# Patient Record
Sex: MALE | Race: WHITE | NOT HISPANIC OR LATINO | Employment: UNEMPLOYED | URBAN - METROPOLITAN AREA
[De-identification: names, ages, dates, MRNs, and addresses within clinical notes are randomized per-mention and may not be internally consistent; named-entity substitution may affect disease eponyms.]

---

## 2019-09-14 ENCOUNTER — HOSPITAL ENCOUNTER (INPATIENT)
Facility: HOSPITAL | Age: 54
LOS: 5 days | Discharge: HOME | End: 2019-09-19
Attending: EMERGENCY MEDICINE | Admitting: EMERGENCY MEDICINE
Payer: MEDICAID

## 2019-09-14 DIAGNOSIS — R07.9 CHEST PAIN: ICD-10-CM

## 2019-09-14 DIAGNOSIS — J93.11 PRIMARY SPONTANEOUS PNEUMOTHORAX: Primary | ICD-10-CM

## 2019-09-14 DIAGNOSIS — J98.4 CAVITARY LESION OF LUNG: ICD-10-CM

## 2019-09-14 LAB
ALBUMIN SERPL BCP-MCNC: 4.1 G/DL (ref 3.5–5.2)
ALP SERPL-CCNC: 102 U/L (ref 55–135)
ALT SERPL W/O P-5'-P-CCNC: 36 U/L (ref 10–44)
ANION GAP SERPL CALC-SCNC: 11 MMOL/L (ref 8–16)
AST SERPL-CCNC: 63 U/L (ref 10–40)
BASOPHILS # BLD AUTO: 0.07 K/UL (ref 0–0.2)
BASOPHILS NFR BLD: 0.8 % (ref 0–1.9)
BILIRUB SERPL-MCNC: 0.3 MG/DL (ref 0.1–1)
BILIRUB UR QL STRIP: NEGATIVE
BNP SERPL-MCNC: <10 PG/ML (ref 0–99)
BUN SERPL-MCNC: 8 MG/DL (ref 6–20)
CALCIUM SERPL-MCNC: 9.4 MG/DL (ref 8.7–10.5)
CHLORIDE SERPL-SCNC: 104 MMOL/L (ref 95–110)
CLARITY UR REFRACT.AUTO: CLEAR
CO2 SERPL-SCNC: 28 MMOL/L (ref 23–29)
COLOR UR AUTO: YELLOW
CREAT SERPL-MCNC: 0.6 MG/DL (ref 0.5–1.4)
DIFFERENTIAL METHOD: ABNORMAL
EOSINOPHIL # BLD AUTO: 0.2 K/UL (ref 0–0.5)
EOSINOPHIL NFR BLD: 2.4 % (ref 0–8)
ERYTHROCYTE [DISTWIDTH] IN BLOOD BY AUTOMATED COUNT: 15 % (ref 11.5–14.5)
EST. GFR  (AFRICAN AMERICAN): >60 ML/MIN/1.73 M^2
EST. GFR  (NON AFRICAN AMERICAN): >60 ML/MIN/1.73 M^2
ETHANOL SERPL-MCNC: 289 MG/DL
GLUCOSE SERPL-MCNC: 109 MG/DL (ref 70–110)
GLUCOSE UR QL STRIP: NEGATIVE
HCT VFR BLD AUTO: 44.2 % (ref 40–54)
HGB BLD-MCNC: 14.2 G/DL (ref 14–18)
HGB UR QL STRIP: NEGATIVE
IMM GRANULOCYTES # BLD AUTO: 0.02 K/UL (ref 0–0.04)
IMM GRANULOCYTES NFR BLD AUTO: 0.2 % (ref 0–0.5)
INR PPP: 1 (ref 0.8–1.2)
KETONES UR QL STRIP: NEGATIVE
LACTATE SERPL-SCNC: 1.6 MMOL/L (ref 0.5–2.2)
LEUKOCYTE ESTERASE UR QL STRIP: NEGATIVE
LYMPHOCYTES # BLD AUTO: 2.8 K/UL (ref 1–4.8)
LYMPHOCYTES NFR BLD: 33.3 % (ref 18–48)
MCH RBC QN AUTO: 32.9 PG (ref 27–31)
MCHC RBC AUTO-ENTMCNC: 32.1 G/DL (ref 32–36)
MCV RBC AUTO: 102 FL (ref 82–98)
MONOCYTES # BLD AUTO: 0.7 K/UL (ref 0.3–1)
MONOCYTES NFR BLD: 8.5 % (ref 4–15)
NEUTROPHILS # BLD AUTO: 4.7 K/UL (ref 1.8–7.7)
NEUTROPHILS NFR BLD: 54.8 % (ref 38–73)
NITRITE UR QL STRIP: NEGATIVE
NRBC BLD-RTO: 0 /100 WBC
PH UR STRIP: 5 [PH] (ref 5–8)
PLATELET # BLD AUTO: 196 K/UL (ref 150–350)
PMV BLD AUTO: 10.8 FL (ref 9.2–12.9)
POTASSIUM SERPL-SCNC: 4.1 MMOL/L (ref 3.5–5.1)
PROT SERPL-MCNC: 8.2 G/DL (ref 6–8.4)
PROT UR QL STRIP: NEGATIVE
PROTHROMBIN TIME: 10.2 SEC (ref 9–12.5)
RBC # BLD AUTO: 4.32 M/UL (ref 4.6–6.2)
SODIUM SERPL-SCNC: 143 MMOL/L (ref 136–145)
SP GR UR STRIP: >=1.03 (ref 1–1.03)
TROPONIN I SERPL DL<=0.01 NG/ML-MCNC: <0.006 NG/ML (ref 0–0.03)
URN SPEC COLLECT METH UR: ABNORMAL
WBC # BLD AUTO: 8.5 K/UL (ref 3.9–12.7)

## 2019-09-14 PROCEDURE — 81003 URINALYSIS AUTO W/O SCOPE: CPT

## 2019-09-14 PROCEDURE — 85025 COMPLETE CBC W/AUTO DIFF WBC: CPT

## 2019-09-14 PROCEDURE — 87102 FUNGUS ISOLATION CULTURE: CPT

## 2019-09-14 PROCEDURE — 93005 ELECTROCARDIOGRAM TRACING: CPT

## 2019-09-14 PROCEDURE — 63600175 PHARM REV CODE 636 W HCPCS: Performed by: INTERNAL MEDICINE

## 2019-09-14 PROCEDURE — 93010 EKG 12-LEAD: ICD-10-PCS | Mod: ,,, | Performed by: INTERNAL MEDICINE

## 2019-09-14 PROCEDURE — 99285 EMERGENCY DEPT VISIT HI MDM: CPT | Mod: ,,, | Performed by: EMERGENCY MEDICINE

## 2019-09-14 PROCEDURE — 11000001 HC ACUTE MED/SURG PRIVATE ROOM

## 2019-09-14 PROCEDURE — 25000003 PHARM REV CODE 250: Performed by: PHYSICIAN ASSISTANT

## 2019-09-14 PROCEDURE — 25500020 PHARM REV CODE 255: Performed by: EMERGENCY MEDICINE

## 2019-09-14 PROCEDURE — 99285 EMERGENCY DEPT VISIT HI MDM: CPT | Mod: 25

## 2019-09-14 PROCEDURE — 99233 SBSQ HOSP IP/OBS HIGH 50: CPT | Mod: ,,, | Performed by: INTERNAL MEDICINE

## 2019-09-14 PROCEDURE — 99223 1ST HOSP IP/OBS HIGH 75: CPT | Mod: ,,, | Performed by: PHYSICIAN ASSISTANT

## 2019-09-14 PROCEDURE — 83605 ASSAY OF LACTIC ACID: CPT

## 2019-09-14 PROCEDURE — 87106 FUNGI IDENTIFICATION YEAST: CPT

## 2019-09-14 PROCEDURE — 87116 MYCOBACTERIA CULTURE: CPT

## 2019-09-14 PROCEDURE — 85610 PROTHROMBIN TIME: CPT

## 2019-09-14 PROCEDURE — 25000003 PHARM REV CODE 250: Performed by: EMERGENCY MEDICINE

## 2019-09-14 PROCEDURE — 87040 BLOOD CULTURE FOR BACTERIA: CPT

## 2019-09-14 PROCEDURE — 87205 SMEAR GRAM STAIN: CPT

## 2019-09-14 PROCEDURE — 87070 CULTURE OTHR SPECIMN AEROBIC: CPT

## 2019-09-14 PROCEDURE — 99285 PR EMERGENCY DEPT VISIT,LEVEL V: ICD-10-PCS | Mod: ,,, | Performed by: EMERGENCY MEDICINE

## 2019-09-14 PROCEDURE — 80320 DRUG SCREEN QUANTALCOHOLS: CPT

## 2019-09-14 PROCEDURE — 83880 ASSAY OF NATRIURETIC PEPTIDE: CPT

## 2019-09-14 PROCEDURE — 93010 ELECTROCARDIOGRAM REPORT: CPT | Mod: ,,, | Performed by: INTERNAL MEDICINE

## 2019-09-14 PROCEDURE — S4991 NICOTINE PATCH NONLEGEND: HCPCS | Performed by: EMERGENCY MEDICINE

## 2019-09-14 PROCEDURE — 99223 PR INITIAL HOSPITAL CARE,LEVL III: ICD-10-PCS | Mod: ,,, | Performed by: PHYSICIAN ASSISTANT

## 2019-09-14 PROCEDURE — 84484 ASSAY OF TROPONIN QUANT: CPT

## 2019-09-14 PROCEDURE — 87206 SMEAR FLUORESCENT/ACID STAI: CPT

## 2019-09-14 PROCEDURE — 25000003 PHARM REV CODE 250: Performed by: INTERNAL MEDICINE

## 2019-09-14 PROCEDURE — 87015 SPECIMEN INFECT AGNT CONCNTJ: CPT

## 2019-09-14 PROCEDURE — 27000221 HC OXYGEN, UP TO 24 HOURS

## 2019-09-14 PROCEDURE — 99233 PR SUBSEQUENT HOSPITAL CARE,LEVL III: ICD-10-PCS | Mod: ,,, | Performed by: INTERNAL MEDICINE

## 2019-09-14 PROCEDURE — 80053 COMPREHEN METABOLIC PANEL: CPT

## 2019-09-14 PROCEDURE — 63600175 PHARM REV CODE 636 W HCPCS: Performed by: EMERGENCY MEDICINE

## 2019-09-14 RX ORDER — LORAZEPAM 2 MG/ML
1 INJECTION INTRAMUSCULAR
Status: COMPLETED | OUTPATIENT
Start: 2019-09-14 | End: 2019-09-14

## 2019-09-14 RX ORDER — ONDANSETRON 8 MG/1
8 TABLET, ORALLY DISINTEGRATING ORAL EVERY 8 HOURS PRN
Status: DISCONTINUED | OUTPATIENT
Start: 2019-09-14 | End: 2019-09-19 | Stop reason: HOSPADM

## 2019-09-14 RX ORDER — OXYCODONE HYDROCHLORIDE 5 MG/1
5 TABLET ORAL EVERY 4 HOURS PRN
Status: DISCONTINUED | OUTPATIENT
Start: 2019-09-14 | End: 2019-09-15

## 2019-09-14 RX ORDER — ACETAMINOPHEN 325 MG/1
650 TABLET ORAL EVERY 4 HOURS PRN
Status: DISCONTINUED | OUTPATIENT
Start: 2019-09-14 | End: 2019-09-19 | Stop reason: HOSPADM

## 2019-09-14 RX ORDER — HYDROMORPHONE HYDROCHLORIDE 1 MG/ML
0.5 INJECTION, SOLUTION INTRAMUSCULAR; INTRAVENOUS; SUBCUTANEOUS ONCE
Status: COMPLETED | OUTPATIENT
Start: 2019-09-14 | End: 2019-09-14

## 2019-09-14 RX ORDER — SODIUM CHLORIDE 0.9 % (FLUSH) 0.9 %
10 SYRINGE (ML) INJECTION
Status: DISCONTINUED | OUTPATIENT
Start: 2019-09-14 | End: 2019-09-19 | Stop reason: HOSPADM

## 2019-09-14 RX ORDER — LIDOCAINE HYDROCHLORIDE 10 MG/ML
10 INJECTION INFILTRATION; PERINEURAL ONCE
Status: COMPLETED | OUTPATIENT
Start: 2019-09-14 | End: 2019-09-14

## 2019-09-14 RX ORDER — DIAZEPAM 5 MG/1
5 TABLET ORAL
Status: COMPLETED | OUTPATIENT
Start: 2019-09-14 | End: 2019-09-14

## 2019-09-14 RX ORDER — IBUPROFEN 200 MG
1 TABLET ORAL
Status: COMPLETED | OUTPATIENT
Start: 2019-09-14 | End: 2019-09-15

## 2019-09-14 RX ADMIN — LIDOCAINE HYDROCHLORIDE 10 ML: 10 INJECTION, SOLUTION INFILTRATION; PERINEURAL at 03:09

## 2019-09-14 RX ADMIN — LORAZEPAM 1 MG: 2 INJECTION INTRAMUSCULAR; INTRAVENOUS at 01:09

## 2019-09-14 RX ADMIN — DIAZEPAM 5 MG: 5 TABLET ORAL at 11:09

## 2019-09-14 RX ADMIN — OXYCODONE HYDROCHLORIDE 5 MG: 5 TABLET ORAL at 08:09

## 2019-09-14 RX ADMIN — IOHEXOL 75 ML: 350 INJECTION, SOLUTION INTRAVENOUS at 10:09

## 2019-09-14 RX ADMIN — LORAZEPAM 1 MG: 2 INJECTION INTRAMUSCULAR; INTRAVENOUS at 03:09

## 2019-09-14 RX ADMIN — NICOTINE 1 PATCH: 21 PATCH, EXTENDED RELEASE TRANSDERMAL at 09:09

## 2019-09-14 RX ADMIN — HYDROMORPHONE HYDROCHLORIDE 0.5 MG: 1 INJECTION, SOLUTION INTRAMUSCULAR; INTRAVENOUS; SUBCUTANEOUS at 04:09

## 2019-09-14 RX ADMIN — ACETAMINOPHEN 650 MG: 325 TABLET ORAL at 03:09

## 2019-09-14 NOTE — H&P
"[facility] Hospital Medicine  History & Physical    Patient Name: Ki Chacon  MRN: [id number]  Admission Date: 9/14/2019  Attending Physician: Frederick Dowd MD   Primary Care Provider: Primary Doctor [facility] Medicine Team: [facility] MED  Danielle Abarca PA-C     Patient information was obtained from patient, past medical records and ER records.     Subjective:     Principal Problem:Primary spontaneous pneumothorax    Chief Complaint:   Chief Complaint   Patient presents with    Shortness of Breath     hx spont pneumo, has bacteria in lung that made hole in lung        HPI: Patient is a 54yo male with a PMHx of emphysema being admitted to medicine for spontaneous pneumothorax. Patient reports onset of chest pain and shortness of breath over the last several days. He first noticed his cough over the last year and it has progressively worsened with dyspnea on exertion. He has found it more difficult to ambulate or do any of his ADLs. He reports associated cough with sputum, fevers, nightsweats, and 20 pound weight loss in the last several months. He reports traveling as an  for work, but was recently in [place] in July for work and was admitted to the hospital for workup of hemoptysis and shortness of breath. At that time, he was found to have a cavitary lesion in his left lung. He reports an extensive workup with sputum samples that was negative for TB, but was diagnosed with a "bacteria that is not found in the [place]". He advised to contact his doctor at previous hospital for further information. He returned from [place] and has been unable to help care for his wife who was hospitalized for a hip replacement due to his illness. He reports smoking 1 pack per day for 35 years. He denies travel outside of the [place].    Of note, patient has a history of recurrent pneumothorax since he broke two ribs in 1995. He had another traumatic event in 2005 that resulted in another " "pneumothorax. Since that time, he has had several "crackles" in his lung that he has managed on his own.     In the ED, vitals stable on 4L O2. Intake labs remarkable for alcohol 289. CTA chest showed left apical cavitary lesion with peripheral nodular thickening measuring approximately 3.9 x 2.9 cm. Additional left upper lobe spiculated pulmonary nodule measuring 1.3 cm, right lower lobe ground-glass opacity; right apical cavitary nodule measuring 1.5 cm; Right upper lobe solid nodule measuring 0.9 cm; Upper lobe predominant centrilobular and paraseptal emphysema as well as bronchiectasis. Pulmonology consulted and placed a chest tube for pneumothorax treatment, cultures obtained.    Past Medical History:   Diagnosis Date    Ankylosis     Emphysema of lung     Pneumonia     Pneumothorax        Past Surgical History:   Procedure Laterality Date    BLADDER DIVERTICULECTOMY      CHEST TUBE INSERTION Left        Review of patient's allergies indicates:  No Known Allergies    No current facility-administered medications on file prior to encounter.      No current outpatient medications on file prior to encounter.     Family History     None        Tobacco Use    Smoking status: Current Every Day Smoker     Packs/day: 1.00     Types: Cigarettes    Smokeless tobacco: Never Used   Substance and Sexual Activity    Alcohol use: Yes     Comment: daily, 6 pack, last drink 5am today    Drug use: Never    Sexual activity: Not on file     Review of Systems   Constitutional: Positive for chills, diaphoresis, fever and unexpected weight change. Negative for activity change.   HENT: Negative for trouble swallowing.    Eyes: Negative for photophobia and visual disturbance.   Respiratory: Positive for cough, chest tightness and shortness of breath. Negative for wheezing.    Cardiovascular: Positive for chest pain. Negative for palpitations and leg swelling.   Gastrointestinal: Negative for abdominal pain, constipation, " diarrhea, nausea and vomiting.   Genitourinary: Negative for dysuria, frequency, hematuria and urgency.   Musculoskeletal: Negative for arthralgias, back pain and gait problem.   Skin: Negative for color change and rash.   Neurological: Negative for dizziness, syncope, weakness, light-headedness, numbness and headaches.   Psychiatric/Behavioral: Negative for agitation and confusion. The patient is not nervous/anxious.      Objective:     Vital Signs (Most Recent):  Temp: 98 °F (36.7 °C) (09/14/19 1200)  Pulse: 77 (09/14/19 1400)  Resp: 17 (09/14/19 1400)  BP: 126/84 (09/14/19 1400)  SpO2: 99 % (09/14/19 1400) Vital Signs (24h Range):  Temp:  [97.7 °F (36.5 °C)-98 °F (36.7 °C)] 98 °F (36.7 °C)  Pulse:  [77-99] 77  Resp:  [16-20] 17  SpO2:  [95 %-100 %] 99 %  BP: (116-162)/(75-94) 126/84     Weight: 48.4 kg (106 lb 11.2 oz)  Body mass index is 15.31 kg/m².    Physical Exam   Constitutional: He is oriented to person, place, and time. He appears well-nourished. He appears cachectic. No distress. Nasal cannula in place.   HENT:   Head: Normocephalic and atraumatic.   Mouth/Throat: No oropharyngeal exudate.   Eyes: Pupils are equal, round, and reactive to light. Conjunctivae and EOM are normal.   Neck: Normal range of motion. Neck supple.   Cardiovascular: Normal rate, regular rhythm, normal heart sounds and intact distal pulses.   Pulmonary/Chest: Accessory muscle usage present. No respiratory distress. He has decreased breath sounds (diffuse). He has no wheezes.   Abdominal: Soft. Bowel sounds are normal. He exhibits no distension. There is no tenderness.   Musculoskeletal: Normal range of motion. He exhibits no edema or tenderness.   Lymphadenopathy:     He has no cervical adenopathy.   Neurological: He is alert and oriented to person, place, and time. No cranial nerve deficit or sensory deficit. Coordination normal.   Skin: Skin is warm and dry. Capillary refill takes less than 2 seconds. No rash noted.   Psychiatric:  He has a normal mood and affect. His behavior is normal. Judgment and thought content normal.   Nursing note and vitals reviewed.        CRANIAL NERVES     CN III, IV, VI   Pupils are equal, round, and reactive to light.  Extraocular motions are normal.        Significant Labs:   BMP:   Recent Labs   Lab 09/14/19  0830         K 4.1      CO2 28   BUN 8   CREATININE 0.6   CALCIUM 9.4     CBC:   Recent Labs   Lab 09/14/19  0830   WBC 8.50   HGB 14.2   HCT 44.2        All pertinent labs within the past 24 hours have been reviewed.    Significant Imaging: I have reviewed all pertinent imaging results/findings within the past 24 hours.    Assessment/Plan:     * Primary spontaneous pneumothorax  Cavitary lesion of lung    Patient presents with recurrent cough, shortness of breath and was found to have pneumothorax on CTA. CTA showed left apical cavitary lesion with peripheral nodular thickening measuring approximately 3.9 x 2.9 cm;  Additional left upper lobe spiculated pulmonary nodule measuring 1.3 cm; Right lower lobe ground-glass opacity; Right apical cavitary nodule measuring 1.5 cm;  Right upper lobe solid nodule measuring 0.9 cm. Upper lobe predominant centrilobular and paraseptal emphysema as well as bronchiectasis.  - currently afebrile without leukocytosis  - Pulmonology consulted, chest tube placed to suction  - cultures obtained, follow  - airborne precautions  - placed on 4L NC  - recheck CXR in AM to check resolution  - additional pulm recs pending for eval of cavitary lesions      VTE Risk Mitigation (From admission, onward)        Ordered     Place ALFA hose  Until discontinued      09/14/19 1454     Place sequential compression device  Until discontinued      09/14/19 1454     IP VTE LOW RISK PATIENT  Once      09/14/19 1454             Danielle Abarca PA-C  Department of Hospital Medicine   [facility]

## 2019-09-14 NOTE — CONSULTS
[facility]  Pulmonology  Consult Note    Patient Name: Ki Chacon  MRN: [id number]  Admission Date: 9/14/2019  Hospital Length of Stay: 0 days  Code Status: Full Code  Attending Physician: Vadim Javed, *  Primary Care Provider: Primary Doctor No   Principal Problem: Primary spontaneous pneumothorax    Inpatient consult to Pulmonology  Consult performed by: Brian Dumont MD  Consult ordered by: Danielle Abarca Jr., KAELYN        Subjective:     HPI:  54 y/o M with PMH of emphysema, tobacco dependence, anxiety and prior h/o spontaneous pneumothorax who presents to the ED c/o left sided chest pain, with imaging showing a large left sided pneumothorax and b/l cavitary lung lesions. While talking to the pt, he was actually admitted to a hospital 2 months ago while working in [place]. He brought all the paperwork and at that time, he was admitted for pneumonia with sputum's growing pseudomonas. At that time, he had CT chest that showed left cavitary lung lesion. He had 3 AFBs which were negative. He never had bronchoscopy at that hospital. HIV was negative. He was treated for pneumonia and discharged home. While talking to the pt, he denies any h/o TB, no family h/o TB and no known contacts with TB. He was born in [place] but currently lives in [place]. He denies ever seeing a pulmonologist. He does admit that over the past week, he's been having night sweats, lack of appetite. He did have weight loss while hospitalized 2 months ago, but has since gained some weight (however still below his baseline). He does admit to some hemoptysis a few weeks ago and intermittently but none recently. He is a chronic smoker of about 1-2 ppd for 35 years. Denies any travel outside the country.    Past Medical History:   Diagnosis Date    Ankylosis     Emphysema of lung     Pneumonia     Pneumothorax        Past Surgical History:   Procedure Laterality Date    BLADDER DIVERTICULECTOMY       CHEST TUBE INSERTION Left        Review of patient's allergies indicates:  No Known Allergies    Family History     None        Tobacco Use    Smoking status: Current Every Day Smoker     Packs/day: 1.00     Types: Cigarettes    Smokeless tobacco: Never Used   Substance and Sexual Activity    Alcohol use: Yes     Comment: daily, 6 pack, last drink 5am today    Drug use: Never    Sexual activity: Not on file         Review of Systems   Constitutional: Positive for appetite change, chills and fatigue. Negative for activity change and fever.   HENT: Negative.    Eyes: Negative.    Respiratory: Positive for chest tightness and shortness of breath. Negative for apnea, choking, wheezing and stridor.    Cardiovascular: Positive for chest pain.   Gastrointestinal: Negative.    Endocrine: Negative.    Genitourinary: Negative.    Musculoskeletal: Negative.    Skin: Negative.    Neurological: Negative.    Hematological: Negative.    Psychiatric/Behavioral: Negative.      Objective:     Vital Signs (Most Recent):  Temp: 98.3 °F (36.8 °C) (09/14/19 1806)  Pulse: 67 (09/14/19 1806)  Resp: 16 (09/14/19 1806)  BP: 132/76 (09/14/19 1806)  SpO2: 100 % (09/14/19 1806) Vital Signs (24h Range):  Temp:  [97.7 °F (36.5 °C)-98.3 °F (36.8 °C)] 98.3 °F (36.8 °C)  Pulse:  [67-99] 67  Resp:  [16-20] 16  SpO2:  [95 %-100 %] 100 %  BP: (116-162)/(75-94) 132/76     Weight: 48.4 kg (106 lb 11.2 oz)  Body mass index is 15.31 kg/m².    No intake or output data in the 24 hours ending 09/14/19 1817    Physical Exam   Constitutional: He is oriented to person, place, and time. No distress.   Cachectic appearing male   HENT:   Head: Normocephalic and atraumatic.   Right Ear: External ear normal.   Left Ear: External ear normal.   Eyes: Pupils are equal, round, and reactive to light. EOM are normal. Right eye exhibits no discharge. Left eye exhibits no discharge.   Neck: Normal range of motion. Neck supple. No tracheal deviation present. No  thyromegaly present.   Cardiovascular: Normal rate, regular rhythm and normal heart sounds. Exam reveals no friction rub.   No murmur heard.  Pulmonary/Chest: Effort normal. No stridor. No respiratory distress. He has no wheezes.   Diminished lung sounds on left compared to right   Abdominal: Soft. Bowel sounds are normal. He exhibits no distension. There is no tenderness.   Musculoskeletal: Normal range of motion. He exhibits no edema or deformity.   Neurological: He is alert and oriented to person, place, and time. No cranial nerve deficit.   Skin: Skin is warm. Capillary refill takes less than 2 seconds. He is not diaphoretic.   + black skin lesion on right leg.   Psychiatric: He has a normal mood and affect. His behavior is normal.       Vents:       Lines/Drains/Airways     Drain                 Chest Tube 09/14/19 1628 1 Left Mediastinal less than 1 day          Peripheral Intravenous Line                 Peripheral IV - Single Lumen 09/14/19 0828 18 G Left Antecubital less than 1 day                Significant Labs:    CBC/Anemia Profile:  Recent Labs   Lab 09/14/19  0830   WBC 8.50   HGB 14.2   HCT 44.2      *   RDW 15.0*        Chemistries:  Recent Labs   Lab 09/14/19  0830      K 4.1      CO2 28   BUN 8   CREATININE 0.6   CALCIUM 9.4   ALBUMIN 4.1   PROT 8.2   BILITOT 0.3   ALKPHOS 102   ALT 36   AST 63*       All pertinent labs within the past 24 hours have been reviewed.    Significant Imaging:   I have reviewed and interpreted all pertinent imaging results/findings within the past 24 hours.    Assessment/Plan:     * Primary spontaneous pneumothorax  Pt has emphysema/copd. CT scan from 2 months ago showed large MAINOR cavitary lesion with consolidation around it. Unsure if he had a bullae behind that that ruptured?   - we placed a chest tube today in left. Please keep to wall suction.    Cavitary lesion of lung  Pt has b/l cavitary lung lesions with a spiculated nodule in the RUL.  Interestingly, his RUL cavitary lung lesion wasn't as prominent as it is on our CT scan 2 months later. He had 3 AFB's that were negative in [place] 2 months ago. He had a sputum that grew pseudomonas at that time and was treated. He has been having night sweats and intermittent hemoptysis. Differential includes NTM or malignancy.  - will try and get sputum and send it for viral, fungal, cell count/smear and AFB  - will tentatively plan on bronchoscopy on Monday with BAL.          Thank you for your consult. I will follow-up with patient. Please contact us if you have any additional questions.     Brian Dumont MD  Pulmonology Fellow  [facility]

## 2019-09-14 NOTE — SUBJECTIVE & OBJECTIVE
Past Medical History:   Diagnosis Date    Ankylosis     Emphysema of lung     Pneumonia     Pneumothorax        Past Surgical History:   Procedure Laterality Date    BLADDER DIVERTICULECTOMY      CHEST TUBE INSERTION Left        Review of patient's allergies indicates:  No Known Allergies    No current facility-administered medications on file prior to encounter.      No current outpatient medications on file prior to encounter.     Family History     None        Tobacco Use    Smoking status: Current Every Day Smoker     Packs/day: 1.00     Types: Cigarettes    Smokeless tobacco: Never Used   Substance and Sexual Activity    Alcohol use: Yes     Comment: daily, 6 pack, last drink 5am today    Drug use: Never    Sexual activity: Not on file     Review of Systems   Constitutional: Positive for chills, diaphoresis, fever and unexpected weight change. Negative for activity change.   HENT: Negative for trouble swallowing.    Eyes: Negative for photophobia and visual disturbance.   Respiratory: Positive for cough, chest tightness and shortness of breath. Negative for wheezing.    Cardiovascular: Positive for chest pain. Negative for palpitations and leg swelling.   Gastrointestinal: Negative for abdominal pain, constipation, diarrhea, nausea and vomiting.   Genitourinary: Negative for dysuria, frequency, hematuria and urgency.   Musculoskeletal: Negative for arthralgias, back pain and gait problem.   Skin: Negative for color change and rash.   Neurological: Negative for dizziness, syncope, weakness, light-headedness, numbness and headaches.   Psychiatric/Behavioral: Negative for agitation and confusion. The patient is not nervous/anxious.      Objective:     Vital Signs (Most Recent):  Temp: 98 °F (36.7 °C) (09/14/19 1200)  Pulse: 77 (09/14/19 1400)  Resp: 17 (09/14/19 1400)  BP: 126/84 (09/14/19 1400)  SpO2: 99 % (09/14/19 1400) Vital Signs (24h Range):  Temp:  [97.7 °F (36.5 °C)-98 °F (36.7 °C)] 98 °F (36.7  °C)  Pulse:  [77-99] 77  Resp:  [16-20] 17  SpO2:  [95 %-100 %] 99 %  BP: (116-162)/(75-94) 126/84     Weight: 48.4 kg (106 lb 11.2 oz)  Body mass index is 15.31 kg/m².    Physical Exam   Constitutional: He is oriented to person, place, and time. He appears well-nourished. He appears cachectic. No distress. Nasal cannula in place.   HENT:   Head: Normocephalic and atraumatic.   Mouth/Throat: No oropharyngeal exudate.   Eyes: Pupils are equal, round, and reactive to light. Conjunctivae and EOM are normal.   Neck: Normal range of motion. Neck supple.   Cardiovascular: Normal rate, regular rhythm, normal heart sounds and intact distal pulses.   Pulmonary/Chest: Accessory muscle usage present. No respiratory distress. He has decreased breath sounds (diffuse). He has no wheezes.   Abdominal: Soft. Bowel sounds are normal. He exhibits no distension. There is no tenderness.   Musculoskeletal: Normal range of motion. He exhibits no edema or tenderness.   Lymphadenopathy:     He has no cervical adenopathy.   Neurological: He is alert and oriented to person, place, and time. No cranial nerve deficit or sensory deficit. Coordination normal.   Skin: Skin is warm and dry. Capillary refill takes less than 2 seconds. No rash noted.   Psychiatric: He has a normal mood and affect. His behavior is normal. Judgment and thought content normal.   Nursing note and vitals reviewed.        CRANIAL NERVES     CN III, IV, VI   Pupils are equal, round, and reactive to light.  Extraocular motions are normal.        Significant Labs:   BMP:   Recent Labs   Lab 09/14/19  0830         K 4.1      CO2 28   BUN 8   CREATININE 0.6   CALCIUM 9.4     CBC:   Recent Labs   Lab 09/14/19  0830   WBC 8.50   HGB 14.2   HCT 44.2        All pertinent labs within the past 24 hours have been reviewed.    Significant Imaging: I have reviewed all pertinent imaging results/findings within the past 24 hours.

## 2019-09-14 NOTE — ED NOTES
Hourly rounding complete. Patient resting in stretcher and is in NAD at this time. Pt is awake alert and oriented x4, VSS. Pt aware of POC, mother remains at bedside. Bed low and locked, SR up x2, call bell in patient reach. Pt remains on continuous cardiac monitor, continuous pulse ox, and auto BP cuff.

## 2019-09-14 NOTE — ED PROVIDER NOTES
Encounter Date: 9/14/2019       History     Chief Complaint   Patient presents with    Shortness of Breath     hx spont pneumo, has bacteria in lung that made hole in lung     53-year-old male presents with chest pain shortness of breath. He has a history of cavitary lung disease.  He has history of spontaneous pneumothorax.  He has a history of COPD.  He has a history of alcohol and nicotine use.    Chest pain has been present for months.  Shortness of breath has been present for months.  Symptoms are moderate and worse over the last few days.  He had hemoptysis several months ago but that is improved.  He denies any fevers.  He has been tested for tuberculosis in July when he was hospitalized in [place].  That was negative. He denies any other exposure to tuberculosis.    He is very concerned about getting chest tubes and does not want to have chest tubes or be cut on.        Review of patient's allergies indicates:  No Known Allergies  Past Medical History:   Diagnosis Date    Ankylosis     Emphysema of lung     Pneumonia     Pneumothorax      Past Surgical History:   Procedure Laterality Date    BLADDER DIVERTICULECTOMY      CHEST TUBE INSERTION Left      History reviewed. No pertinent family history.  Social History     Tobacco Use    Smoking status: Current Every Day Smoker     Packs/day: 1.00     Types: Cigarettes    Smokeless tobacco: Never Used   Substance Use Topics    Alcohol use: Yes     Comment: daily, 6 pack, last drink 5am today    Drug use: Never     Review of Systems   Constitutional: Negative for chills and fever.   HENT: Positive for congestion.    Eyes: Negative for visual disturbance.   Respiratory: Positive for shortness of breath.    Cardiovascular: Positive for chest pain.   Gastrointestinal: Negative for abdominal pain.   Endocrine: Negative for polydipsia and polyuria.   Genitourinary: Negative for difficulty urinating.   Musculoskeletal: Negative for arthralgias.   Skin:  Negative for rash.   Neurological: Negative for dizziness and headaches.   Hematological: Negative for adenopathy.   Psychiatric/Behavioral: Negative for agitation.       Physical Exam     Initial Vitals [09/14/19 0806]   BP Pulse Resp Temp SpO2   126/84 99 18 97.7 °F (36.5 °C) 95 %      MAP       --         Physical Exam    Constitutional: He appears well-developed and well-nourished. He does not appear ill.   Thin    HENT:   Head: Normocephalic and atraumatic.   Eyes: Pupils are equal, round, and reactive to light.   Neck: Normal range of motion. Neck supple. No JVD present.   Cardiovascular: Normal rate, regular rhythm, normal heart sounds and intact distal pulses.   Pulmonary/Chest: Effort normal.   Breath sounds equal.  Course diminished breath sounds   Abdominal: Soft. Bowel sounds are normal. He exhibits no distension. There is no tenderness. There is no rebound.   Musculoskeletal: Normal range of motion. He exhibits no edema or tenderness.   Neurological: He is alert. He has normal strength. No cranial nerve deficit or sensory deficit. GCS score is 15. GCS eye subscore is 4. GCS verbal subscore is 5. GCS motor subscore is 6.   Skin: Skin is warm. Capillary refill takes less than 2 seconds. No rash noted.   Psychiatric: He has a normal mood and affect.         ED Course   Procedures  Labs Reviewed   CBC W/ AUTO DIFFERENTIAL - Abnormal; Notable for the following components:       Result Value    RBC 4.32 (*)     Mean Corpuscular Volume 102 (*)     Mean Corpuscular Hemoglobin 32.9 (*)     RDW 15.0 (*)     All other components within normal limits   COMPREHENSIVE METABOLIC PANEL - Abnormal; Notable for the following components:    AST 63 (*)     All other components within normal limits   ALCOHOL,MEDICAL (ETHANOL) - Abnormal; Notable for the following components:    Alcohol, Medical, Serum 289 (*)     All other components within normal limits   CULTURE, BLOOD   CULTURE, BLOOD   CULTURE, RESPIRATORY   CULTURE,  FUNGUS   AFB CULTURE & SMEAR   LACTIC ACID, PLASMA   PROTIME-INR   TROPONIN I   B-TYPE NATRIURETIC PEPTIDE          Imaging Results           CTA Chest Non-Coronary (PE Study) (Final result)  Result time 09/14/19 10:34:45    Final result by Anne Marie Wilkinson MD (09/14/19 10:34:45)                 Impression:      Moderate size left pneumothorax.    Left apical 3.9 cm cavitary mass with peripheral nodular thickening.  Additional 1.5 cm right upper lobe cavitary nodule and bilateral solid pulmonary nodules.  Constellation of findings concerning for infectious process including atypical infection such is mycobacteria.  Neoplasm may also have this appearance; recommend correlation with prior imaging for further evaluation.    No pulmonary embolism identified to the level of the segmental pulmonary arteries.    Age indeterminate compression deformity of the L1 vertebral body of approximately 25%.    This report was flagged in Epic as abnormal.      Electronically signed by: Anne Marie Wilkinson  Date:    09/14/2019  Time:    10:34             Narrative:    EXAMINATION:  CTA CHEST NON CORONARY    CLINICAL HISTORY:  Chest pain, acute, PE suspected, high pretest prob;    TECHNIQUE:  Low dose axial images, sagittal and coronal reformations were obtained from the thoracic inlet to the lung bases following the IV administration of 75 mL of Omnipaque 350.  Contrast timing was optimized to evaluate the pulmonary arteries.  MIP images were performed.    COMPARISON:  Chest radiograph 09/14/2019    FINDINGS:  Base of Neck: No significant abnormality.    Thoracic soft tissues: Unremarkable.    Aorta: Left-sided aortic arch.  No aneurysm and no significant atherosclerosis    Heart: Normal size. No effusion.    Pulmonary vasculature: Main pulmonary artery is normal in caliber.  No pulmonary embolus identified to the level of the segmental pulmonary arteries.    Hetal/Mediastinum: No pathologic karl enlargement.    Airways:  Patent.    Lungs/Pleura: Moderate-sized left pneumothorax.  No mediastinal shift.  Dependent left lung atelectasis.  Left apical cavitary lesion with peripheral nodular thickening measuring approximately 3.9 x 2.9 cm (image 22, series 3).  Additional left upper lobe spiculated pulmonary nodule measuring 1.3 cm (image 33, series 3).  Right lower lobe ground-glass opacity.  Right apical cavitary nodule measuring 1.5 cm (image 20, series 3).  Right upper lobe solid nodule measuring 0.9 cm (image 30, series 3).  Upper lobe predominant centrilobular and paraseptal emphysema as well as bronchiectasis.  No pleural effusion.    Esophagus: Unremarkable.    Upper Abdomen: No abnormality of the partially imaged upper abdomen.    Bones: Anterior compression deformity of the L1 vertebral body, approximately 25%.  No suspicious lytic or sclerotic lesions.                               X-Ray Chest AP Portable (Final result)  Result time 09/14/19 08:52:31    Final result by Fabian Lee DO (09/14/19 08:52:31)                 Impression:      Please see above      Electronically signed by: Fabian Lee DO  Date:    09/14/2019  Time:    08:52             Narrative:    EXAMINATION:  XR CHEST AP PORTABLE    CLINICAL HISTORY:  Chest Pain;    TECHNIQUE:  Single frontal view of the chest was performed.    COMPARISON:  None    FINDINGS:  There is coarse interstitial opacities throughout the lungs bilaterally which may represent scarring/fibrosis.  There is unusual interstitial opacities and focal lucency in the left lung apex concerning for underlying cavitary lesion with superimposed small sized left pneumothorax most pronounced along the left costophrenic angle.  There is no evidence for right-sided pneumothorax.  Heart size within normal limits.  Probable vascular calcification in the distribution of the coronary arteries or coronary artery stenting.    COMMUNICATION  This critical result was discovered/received at 08:48.  The  critical information above was relayed directly by me by telephone to Dr. Dowd on 09/14/2019 at 08:51.                                 Medical Decision Making:   History:   Old Records Summarized: records from another hospital.       <> Summary of Records: I reviewed records from outside hospitalization July 11th to July 2 18th 2019 he was hospitalized for leukocytosis, pneumonia, left upper lobe lung lesions. He was treated with IV antibiotics and discharged with Cipro and doxycycline and levofloxacin.  CT showed emphysematous changes bilaterally with multiple cystic spaces and ground gas opacities felt to be related to pneumonia  Clinical Tests:   Lab Tests: Ordered and Reviewed  Radiological Study: Ordered and Reviewed  Medical Tests: Ordered and Reviewed  ED Management:  Chest x-ray was reviewed.  Has a pneumothorax on the left and a cavitary lesion on the left.  CTA of the chest confirms this.  I discussed case with Pulmonary Medicine.  Will bring in to observation for Internal Medicine.  Will place on oxygen to reabsorbed the pneumothorax.  Do not believe he needs emergent venting of his chest.  He does not appear to have acute bacterial pneumonia.  He has already been on antibiotics.  He does not have fever or an elevated white count.  Other:   I have discussed this case with another health care provider.       <> Summary of the Discussion: Discussed case with Pulmonary Medicine                      Clinical Impression:       ICD-10-CM ICD-9-CM   1. Primary spontaneous pneumothorax J93.11 512.81   2. Chest pain R07.9 786.50         Disposition:   Disposition: Placed in Observation  Condition: Stable                        Frederick Dowd MD  09/14/19 [id number]

## 2019-09-14 NOTE — ED NOTES
Patient changed self from hospital gown back into clothes, declining remaining in gown at this time.

## 2019-09-14 NOTE — ED NOTES
Hourly rounding complete. Patient resting in stretcher and is in NAD at this time. Pt is awake alert and oriented x4, VSS. Pt aware of POC.. Bed low and locked, SR up x2, call bell in patient reach.

## 2019-09-14 NOTE — ED TRIAGE NOTES
"Ki Chacon, a 53 y.o. male presents to the ED via personal transportation with CC CP and SOB for months, reports last seen in July for complaints diagnosed with "lung infection" and prescribed antibiotics, states did not take the antibiotics because he could not afford them.    Patient identifiers verified verbally with patient and correct for Ki Chacon.    LOC/ APPEARANCE: The patient is AAOx4. Pt is speaking appropriately, no slurred speech. Pt changed into hospital gown. Continuous cardiac monitor, cont pulse ox, and auto BP cuff applied to patient. No JVD visible. Pt reports pain level of 7/10 to left chest. Pt updated on POC. Pt's mother at bedside. Bed low and locked with side rails up x2, call bell in pt reach.  SKIN: Skin is warm dry and intact, and color is consistent with ethnicity. Capillary refill <3 seconds. No breakdown or brusing visible. Mucus membranes moist, acyanotic.  RESPIRATORY: Pt c/o SOB, increased when lying flat. Airway is open and patent. Respirations-spontaneous, unlabored, regular rate, equal bilaterally on inspiration and expiration. No accessory muscle use noted. Breath sounds coarse throughout.  CARDIAC: Patient has regular heart rate. No peripheral edema noted. Pt c/o left chest pain for "months", increased with inspiration. Peripheral pulses present equal and strong throughout.  ABDOMEN: Soft and non-tender to palpation with no distention noted. Normoactive bowel sounds x4 quadrants. Pt has no complaints of abnormal bowel movements, denies blood in stool. Pt reports normal appetite.   NEUROLOGIC: Eyes open spontaneously and facial expression symmetrical. Pt behavior appropriate to situation, and pt follows commands. Pt reports sensation present in all extremities when touched with a finger, denies any numbness or tingling. PERRLA  MUSCULOSKELETAL: Spontaneous movement noted to all extremities. Hand  equal and leg strength strong +5 bilaterally.   : No complaints of " frequency, burning, urgency or blood in the urine. No complaints of incontinence.

## 2019-09-14 NOTE — H&P (VIEW-ONLY)
[facility]  Pulmonology  Consult Note    Patient Name: Ki Chacon  MRN: [id number]  Admission Date: 9/14/2019  Hospital Length of Stay: 0 days  Code Status: Full Code  Attending Physician: Vadim Javed, *  Primary Care Provider: Primary Doctor No   Principal Problem: Primary spontaneous pneumothorax    Inpatient consult to Pulmonology  Consult performed by: Brian Dumont MD  Consult ordered by: Danielle Abarca Jr., KAELYN        Subjective:     HPI:  54 y/o M with PMH of emphysema, tobacco dependence, anxiety and prior h/o spontaneous pneumothorax who presents to the ED c/o left sided chest pain, with imaging showing a large left sided pneumothorax and b/l cavitary lung lesions. While talking to the pt, he was actually admitted to a hospital 2 months ago while working in [place]. He brought all the paperwork and at that time, he was admitted for pneumonia with sputum's growing pseudomonas. At that time, he had CT chest that showed left cavitary lung lesion. He had 3 AFBs which were negative. He never had bronchoscopy at that hospital. HIV was negative. He was treated for pneumonia and discharged home. While talking to the pt, he denies any h/o TB, no family h/o TB and no known contacts with TB. He was born in [place] but currently lives in [place]. He denies ever seeing a pulmonologist. He does admit that over the past week, he's been having night sweats, lack of appetite. He did have weight loss while hospitalized 2 months ago, but has since gained some weight (however still below his baseline). He does admit to some hemoptysis a few weeks ago and intermittently but none recently. He is a chronic smoker of about 1-2 ppd for 35 years. Denies any travel outside the country.    Past Medical History:   Diagnosis Date    Ankylosis     Emphysema of lung     Pneumonia     Pneumothorax        Past Surgical History:   Procedure Laterality Date    BLADDER DIVERTICULECTOMY       CHEST TUBE INSERTION Left        Review of patient's allergies indicates:  No Known Allergies    Family History     None        Tobacco Use    Smoking status: Current Every Day Smoker     Packs/day: 1.00     Types: Cigarettes    Smokeless tobacco: Never Used   Substance and Sexual Activity    Alcohol use: Yes     Comment: daily, 6 pack, last drink 5am today    Drug use: Never    Sexual activity: Not on file         Review of Systems   Constitutional: Positive for appetite change, chills and fatigue. Negative for activity change and fever.   HENT: Negative.    Eyes: Negative.    Respiratory: Positive for chest tightness and shortness of breath. Negative for apnea, choking, wheezing and stridor.    Cardiovascular: Positive for chest pain.   Gastrointestinal: Negative.    Endocrine: Negative.    Genitourinary: Negative.    Musculoskeletal: Negative.    Skin: Negative.    Neurological: Negative.    Hematological: Negative.    Psychiatric/Behavioral: Negative.      Objective:     Vital Signs (Most Recent):  Temp: 98.3 °F (36.8 °C) (09/14/19 1806)  Pulse: 67 (09/14/19 1806)  Resp: 16 (09/14/19 1806)  BP: 132/76 (09/14/19 1806)  SpO2: 100 % (09/14/19 1806) Vital Signs (24h Range):  Temp:  [97.7 °F (36.5 °C)-98.3 °F (36.8 °C)] 98.3 °F (36.8 °C)  Pulse:  [67-99] 67  Resp:  [16-20] 16  SpO2:  [95 %-100 %] 100 %  BP: (116-162)/(75-94) 132/76     Weight: 48.4 kg (106 lb 11.2 oz)  Body mass index is 15.31 kg/m².    No intake or output data in the 24 hours ending 09/14/19 1817    Physical Exam   Constitutional: He is oriented to person, place, and time. No distress.   Cachectic appearing male   HENT:   Head: Normocephalic and atraumatic.   Right Ear: External ear normal.   Left Ear: External ear normal.   Eyes: Pupils are equal, round, and reactive to light. EOM are normal. Right eye exhibits no discharge. Left eye exhibits no discharge.   Neck: Normal range of motion. Neck supple. No tracheal deviation present. No  thyromegaly present.   Cardiovascular: Normal rate, regular rhythm and normal heart sounds. Exam reveals no friction rub.   No murmur heard.  Pulmonary/Chest: Effort normal. No stridor. No respiratory distress. He has no wheezes.   Diminished lung sounds on left compared to right   Abdominal: Soft. Bowel sounds are normal. He exhibits no distension. There is no tenderness.   Musculoskeletal: Normal range of motion. He exhibits no edema or deformity.   Neurological: He is alert and oriented to person, place, and time. No cranial nerve deficit.   Skin: Skin is warm. Capillary refill takes less than 2 seconds. He is not diaphoretic.   + black skin lesion on right leg.   Psychiatric: He has a normal mood and affect. His behavior is normal.       Vents:       Lines/Drains/Airways     Drain                 Chest Tube 09/14/19 1628 1 Left Mediastinal less than 1 day          Peripheral Intravenous Line                 Peripheral IV - Single Lumen 09/14/19 0828 18 G Left Antecubital less than 1 day                Significant Labs:    CBC/Anemia Profile:  Recent Labs   Lab 09/14/19  0830   WBC 8.50   HGB 14.2   HCT 44.2      *   RDW 15.0*        Chemistries:  Recent Labs   Lab 09/14/19  0830      K 4.1      CO2 28   BUN 8   CREATININE 0.6   CALCIUM 9.4   ALBUMIN 4.1   PROT 8.2   BILITOT 0.3   ALKPHOS 102   ALT 36   AST 63*       All pertinent labs within the past 24 hours have been reviewed.    Significant Imaging:   I have reviewed and interpreted all pertinent imaging results/findings within the past 24 hours.    Assessment/Plan:     * Primary spontaneous pneumothorax  Pt has emphysema/copd. CT scan from 2 months ago showed large MAINOR cavitary lesion with consolidation around it. Unsure if he had a bullae behind that that ruptured?   - we placed a chest tube today in left. Please keep to wall suction.    Cavitary lesion of lung  Pt has b/l cavitary lung lesions with a spiculated nodule in the RUL.  Interestingly, his RUL cavitary lung lesion wasn't as prominent as it is on our CT scan 2 months later. He had 3 AFB's that were negative in [place] 2 months ago. He had a sputum that grew pseudomonas at that time and was treated. He has been having night sweats and intermittent hemoptysis. Differential includes NTM or malignancy.  - will try and get sputum and send it for viral, fungal, cell count/smear and AFB  - will tentatively plan on bronchoscopy on Monday with BAL.          Thank you for your consult. I will follow-up with patient. Please contact us if you have any additional questions.     Brian Dumont MD  Pulmonology Fellow  [facility]

## 2019-09-14 NOTE — PROGRESS NOTES
Pt arrived room 1110. No acute distress noted. VSS. Chest tube to suction 20cm. Chest tube site CDI. Family member at bedside. Will continue to monitor.

## 2019-09-14 NOTE — ED NOTES
Hourly rounding complete. Patient resting in stretcher and is in NAD at this time. Pt is awake alert and oriented x4, VSS. Pt aware of POC, mother remains at bedside. Bed low and locked, SR up x2, call bell in patient reach. Pt remains on continuous cardiac monitor, continuous pulse ox, and auto BP cuff. Pt provided specimen cup for sputum culture and aware of need, instructed to press call when able to provide.

## 2019-09-14 NOTE — ASSESSMENT & PLAN NOTE
Pt has b/l cavitary lung lesions with a spiculated nodule in the RUL. Interestingly, his RUL cavitary lung lesion wasn't as prominent as it is on our CT scan 2 months later. He had 3 AFB's that were negative in [place] 2 months ago. He had a sputum that grew pseudomonas at that time and was treated. He has been having night sweats and intermittent hemoptysis. Differential includes NTM or malignancy.  - will try and get sputum and send it for viral, fungal, cell count/smear and AFB  - will tentatively plan on bronchoscopy on Monday with BAL.

## 2019-09-14 NOTE — HPI
54 y/o M with PMH of emphysema, tobacco dependence, anxiety and prior h/o spontaneous pneumothorax who presents to the ED c/o left sided chest pain, with imaging showing a large left sided pneumothorax and b/l cavitary lung lesions. While talking to the pt, he was actually admitted to a hospital 2 months ago while working in [place]. He brought all the paperwork and at that time, he was admitted for pneumonia with sputum's growing pseudomonas. At that time, he had CT chest that showed left cavitary lung lesion. He had 3 AFBs which were negative. He never had bronchoscopy at that hospital. HIV was negative. He was treated for pneumonia and discharged home. While talking to the pt, he denies any h/o TB, no family h/o TB and no known contacts with TB. He was born in [place] but currently lives in [place]. He denies ever seeing a pulmonologist. He does admit that over the past week, he's been having night sweats, lack of appetite. He did have weight loss while hospitalized 2 months ago, but has since gained some weight (however still below his baseline). He does admit to some hemoptysis a few weeks ago and intermittently but none recently. He is a chronic smoker of about 1-2 ppd for 35 years. Denies any travel outside the country.

## 2019-09-14 NOTE — SUBJECTIVE & OBJECTIVE
Past Medical History:   Diagnosis Date    Ankylosis     Emphysema of lung     Pneumonia     Pneumothorax        Past Surgical History:   Procedure Laterality Date    BLADDER DIVERTICULECTOMY      CHEST TUBE INSERTION Left        Review of patient's allergies indicates:  No Known Allergies    Family History     None        Tobacco Use    Smoking status: Current Every Day Smoker     Packs/day: 1.00     Types: Cigarettes    Smokeless tobacco: Never Used   Substance and Sexual Activity    Alcohol use: Yes     Comment: daily, 6 pack, last drink 5am today    Drug use: Never    Sexual activity: Not on file         Review of Systems   Constitutional: Positive for appetite change, chills and fatigue. Negative for activity change and fever.   HENT: Negative.    Eyes: Negative.    Respiratory: Positive for chest tightness and shortness of breath. Negative for apnea, choking, wheezing and stridor.    Cardiovascular: Positive for chest pain.   Gastrointestinal: Negative.    Endocrine: Negative.    Genitourinary: Negative.    Musculoskeletal: Negative.    Skin: Negative.    Neurological: Negative.    Hematological: Negative.    Psychiatric/Behavioral: Negative.      Objective:     Vital Signs (Most Recent):  Temp: 98.3 °F (36.8 °C) (09/14/19 1806)  Pulse: 67 (09/14/19 1806)  Resp: 16 (09/14/19 1806)  BP: 132/76 (09/14/19 1806)  SpO2: 100 % (09/14/19 1806) Vital Signs (24h Range):  Temp:  [97.7 °F (36.5 °C)-98.3 °F (36.8 °C)] 98.3 °F (36.8 °C)  Pulse:  [67-99] 67  Resp:  [16-20] 16  SpO2:  [95 %-100 %] 100 %  BP: (116-162)/(75-94) 132/76     Weight: 48.4 kg (106 lb 11.2 oz)  Body mass index is 15.31 kg/m².    No intake or output data in the 24 hours ending 09/14/19 1817    Physical Exam   Constitutional: He is oriented to person, place, and time. No distress.   Cachectic appearing male   HENT:   Head: Normocephalic and atraumatic.   Right Ear: External ear normal.   Left Ear: External ear normal.   Eyes: Pupils are  equal, round, and reactive to light. EOM are normal. Right eye exhibits no discharge. Left eye exhibits no discharge.   Neck: Normal range of motion. Neck supple. No tracheal deviation present. No thyromegaly present.   Cardiovascular: Normal rate, regular rhythm and normal heart sounds. Exam reveals no friction rub.   No murmur heard.  Pulmonary/Chest: Effort normal. No stridor. No respiratory distress. He has no wheezes.   Diminished lung sounds on left compared to right   Abdominal: Soft. Bowel sounds are normal. He exhibits no distension. There is no tenderness.   Musculoskeletal: Normal range of motion. He exhibits no edema or deformity.   Neurological: He is alert and oriented to person, place, and time. No cranial nerve deficit.   Skin: Skin is warm. Capillary refill takes less than 2 seconds. He is not diaphoretic.   + black skin lesion on right leg.   Psychiatric: He has a normal mood and affect. His behavior is normal.       Vents:       Lines/Drains/Airways     Drain                 Chest Tube 09/14/19 1628 1 Left Mediastinal less than 1 day          Peripheral Intravenous Line                 Peripheral IV - Single Lumen 09/14/19 0828 18 G Left Antecubital less than 1 day                Significant Labs:    CBC/Anemia Profile:  Recent Labs   Lab 09/14/19  0830   WBC 8.50   HGB 14.2   HCT 44.2      *   RDW 15.0*        Chemistries:  Recent Labs   Lab 09/14/19  0830      K 4.1      CO2 28   BUN 8   CREATININE 0.6   CALCIUM 9.4   ALBUMIN 4.1   PROT 8.2   BILITOT 0.3   ALKPHOS 102   ALT 36   AST 63*       All pertinent labs within the past 24 hours have been reviewed.    Significant Imaging:   I have reviewed and interpreted all pertinent imaging results/findings within the past 24 hours.

## 2019-09-14 NOTE — PROCEDURES
"Ki Chacon is a 53 y.o. male patient.    Temp: 98 °F (36.7 °C) (19 1200)  Pulse: 93 (19 1704)  Resp: 17 (19 1400)  BP: 126/84 (19 1400)  SpO2: 100 % (19 1704)  Weight: 48.4 kg (106 lb 11.2 oz) (19 08)  Height: 5' 10" (177.8 cm) (19)       Chest Tube Insertion  Date/Time: 2019 5:12 PM  Location procedure was performed: [facility] CRITICAL CARE MEDICINE  Performed by: Brian Dumont MD  Authorized by: Brian Dumont MD   Assisting provider: Constantine Valencia MD  Post-operative diagnosis: pneumothorax  Pre-operative diagnosis: pneumothorax  Consent Done: Yes  Consent: Verbal consent obtained. Written consent obtained.  Risks and benefits: risks, benefits and alternatives were discussed  Consent given by: patient  Patient understanding: patient states understanding of the procedure being performed  Patient consent: the patient's understanding of the procedure matches consent given  Procedure consent: procedure consent matches procedure scheduled  Relevant documents: relevant documents present and verified  Test results: test results available and properly labeled  Site marked: the operative site was marked  Imaging studies: imaging studies available  Patient identity confirmed: , MRN, name and verbally with patient  Time out: Immediately prior to procedure a "time out" was called to verify the correct patient, procedure, equipment, support staff and site/side marked as required.  Indications: pneumothorax  Patient sedated: no  Anesthesia: local infiltration    Anesthesia:  Local Anesthetic: lidocaine 1% without epinephrine  Anesthetic total: 10 mL  Preparation: skin prepped with ChloraPrep  Placement location: left lateral  Scalpel size: 10  Tube size: 8 Peruvian  Ultrasound guidance: yes  Tube connected to: suction  Suture material: 3-0.  Dressing: 4x4 sterile gauze  Patient tolerance: Patient tolerated the procedure well with no immediate " complications  Technical procedures used: ultrasound guided cuauhtemoc pigtail catheter placement  Complications: No  Estimated blood loss (mL): 0  Specimens: No  Implants: No          Brian Dumont  9/14/2019

## 2019-09-14 NOTE — ED NOTES
Hourly rounding complete. Patient resting in stretcher and is in NAD at this time. Pt is awake alert and oriented x4, VSS. Pt aware of POC, mother remains at bedside. Bed low and locked, SR up x2, call bell in patient reach. Cardiac monitoring not ordered for patient and patient requests to be removed from excess wires, remains on cont pulse ox and auto BP cuff.

## 2019-09-14 NOTE — ASSESSMENT & PLAN NOTE
Cavitary lesion of lung    Patient presents with recurrent cough, shortness of breath and was found to have pneumothorax on CTA. CTA showed left apical cavitary lesion with peripheral nodular thickening measuring approximately 3.9 x 2.9 cm;  Additional left upper lobe spiculated pulmonary nodule measuring 1.3 cm; Right lower lobe ground-glass opacity; Right apical cavitary nodule measuring 1.5 cm;  Right upper lobe solid nodule measuring 0.9 cm. Upper lobe predominant centrilobular and paraseptal emphysema as well as bronchiectasis.  - currently afebrile without leukocytosis  - Pulmonology consulted, chest tube placed to suction  - cultures obtained, follow  - airborne precautions  - placed on 4L NC  - recheck CXR in AM to check resolution  - additional pulm recs pending for eval of cavitary lesions

## 2019-09-14 NOTE — ED NOTES
Pt requested to leave room to smoke, encourage not to do so, presented option for nicotine patch instead and states would accept, Dr Dowd informed and states will place order.

## 2019-09-14 NOTE — HPI
"Patient is a 52yo male with a PMHx of emphysema being admitted to medicine for spontaneous pneumothorax. Patient reports onset of chest pain and shortness of breath over the last several days. He first noticed his cough over the last year and it has progressively worsened with dyspnea on exertion. He has found it more difficult to ambulate or do any of his ADLs. He reports associated cough with sputum, fevers, nightsweats, and 20 pound weight loss in the last several months. He reports traveling as an  for work, but was recently in [place] in July for work and was admitted to the hospital for workup of hemoptysis and shortness of breath. At that time, he was found to have a cavitary lesion in his left lung. He reports an extensive workup with sputum samples that was negative for TB, but was diagnosed with a "bacteria that is not found in the [place]". He advised to contact his doctor at previous hospital for further information. He returned from [place] and has been unable to help care for his wife who was hospitalized for a hip replacement due to his illness. He reports smoking 1 pack per day for 35 years. He denies travel outside of the [place].    Of note, patient has a history of recurrent pneumothorax since he broke two ribs in 1995. He had another traumatic event in 2005 that resulted in another pneumothorax. Since that time, he has had several "crackles" in his lung that he has managed on his own.     In the ED, vitals stable on 4L O2. Intake labs remarkable for alcohol 289. CTA chest showed left apical cavitary lesion with peripheral nodular thickening measuring approximately 3.9 x 2.9 cm. Additional left upper lobe spiculated pulmonary nodule measuring 1.3 cm, right lower lobe ground-glass opacity; right apical cavitary nodule measuring 1.5 cm; Right upper lobe solid nodule measuring 0.9 cm; Upper lobe predominant centrilobular and paraseptal emphysema as well as bronchiectasis. Pulmonology consulted " and placed a chest tube for pneumothorax treatment, cultures obtained.

## 2019-09-14 NOTE — ED NOTES
Hourly rounding complete. Patient resting in stretcher and is in NAD at this time. Pt is awake alert and oriented x4, VSS. Pt aware of POC, mother remains at bedside. Bed low and locked, SR up x2, call bell in patient reach. Pt remains on continuous cardiac monitor, continuous pulse ox, and auto BP cuff. Pt anxious, continues to request to go outside to smoke to MD, valium ordered, will administer as ordered.

## 2019-09-14 NOTE — ASSESSMENT & PLAN NOTE
Pt has emphysema/copd. CT scan from 2 months ago showed large MAINOR cavitary lesion with consolidation around it. Unsure if he had a bullae behind that that ruptured?   - we placed a chest tube today in left. Please keep to wall suction.

## 2019-09-15 PROBLEM — F10.20 ALCOHOL DEPENDENCE: Status: ACTIVE | Noted: 2019-09-15

## 2019-09-15 LAB
ANION GAP SERPL CALC-SCNC: 13 MMOL/L (ref 8–16)
BASOPHILS # BLD AUTO: 0.06 K/UL (ref 0–0.2)
BASOPHILS NFR BLD: 0.8 % (ref 0–1.9)
BUN SERPL-MCNC: 10 MG/DL (ref 6–20)
CALCIUM SERPL-MCNC: 9.8 MG/DL (ref 8.7–10.5)
CHLORIDE SERPL-SCNC: 98 MMOL/L (ref 95–110)
CO2 SERPL-SCNC: 31 MMOL/L (ref 23–29)
CREAT SERPL-MCNC: 0.6 MG/DL (ref 0.5–1.4)
DIFFERENTIAL METHOD: ABNORMAL
EOSINOPHIL # BLD AUTO: 0.3 K/UL (ref 0–0.5)
EOSINOPHIL NFR BLD: 4 % (ref 0–8)
ERYTHROCYTE [DISTWIDTH] IN BLOOD BY AUTOMATED COUNT: 14.7 % (ref 11.5–14.5)
EST. GFR  (AFRICAN AMERICAN): >60 ML/MIN/1.73 M^2
EST. GFR  (NON AFRICAN AMERICAN): >60 ML/MIN/1.73 M^2
GLUCOSE SERPL-MCNC: 67 MG/DL (ref 70–110)
HCT VFR BLD AUTO: 45.5 % (ref 40–54)
HGB BLD-MCNC: 14.3 G/DL (ref 14–18)
IMM GRANULOCYTES # BLD AUTO: 0.03 K/UL (ref 0–0.04)
IMM GRANULOCYTES NFR BLD AUTO: 0.4 % (ref 0–0.5)
LYMPHOCYTES # BLD AUTO: 1.7 K/UL (ref 1–4.8)
LYMPHOCYTES NFR BLD: 24.5 % (ref 18–48)
MCH RBC QN AUTO: 32.6 PG (ref 27–31)
MCHC RBC AUTO-ENTMCNC: 31.4 G/DL (ref 32–36)
MCV RBC AUTO: 104 FL (ref 82–98)
MONOCYTES # BLD AUTO: 0.8 K/UL (ref 0.3–1)
MONOCYTES NFR BLD: 10.6 % (ref 4–15)
NEUTROPHILS # BLD AUTO: 4.2 K/UL (ref 1.8–7.7)
NEUTROPHILS NFR BLD: 59.7 % (ref 38–73)
NRBC BLD-RTO: 0 /100 WBC
PLATELET # BLD AUTO: 176 K/UL (ref 150–350)
PMV BLD AUTO: 10.8 FL (ref 9.2–12.9)
POTASSIUM SERPL-SCNC: 4.5 MMOL/L (ref 3.5–5.1)
RBC # BLD AUTO: 4.38 M/UL (ref 4.6–6.2)
SODIUM SERPL-SCNC: 142 MMOL/L (ref 136–145)
WBC # BLD AUTO: 7.06 K/UL (ref 3.9–12.7)

## 2019-09-15 PROCEDURE — 80048 BASIC METABOLIC PNL TOTAL CA: CPT

## 2019-09-15 PROCEDURE — 85025 COMPLETE CBC W/AUTO DIFF WBC: CPT

## 2019-09-15 PROCEDURE — 99233 PR SUBSEQUENT HOSPITAL CARE,LEVL III: ICD-10-PCS | Mod: ,,, | Performed by: PHYSICIAN ASSISTANT

## 2019-09-15 PROCEDURE — 99233 SBSQ HOSP IP/OBS HIGH 50: CPT | Mod: ,,, | Performed by: PHYSICIAN ASSISTANT

## 2019-09-15 PROCEDURE — 36415 COLL VENOUS BLD VENIPUNCTURE: CPT

## 2019-09-15 PROCEDURE — 25000003 PHARM REV CODE 250: Performed by: PHYSICIAN ASSISTANT

## 2019-09-15 PROCEDURE — 11000001 HC ACUTE MED/SURG PRIVATE ROOM

## 2019-09-15 RX ORDER — BENZONATATE 100 MG/1
200 CAPSULE ORAL 3 TIMES DAILY PRN
Status: DISCONTINUED | OUTPATIENT
Start: 2019-09-15 | End: 2019-09-19 | Stop reason: HOSPADM

## 2019-09-15 RX ORDER — KETOROLAC TROMETHAMINE 30 MG/ML
15 INJECTION, SOLUTION INTRAMUSCULAR; INTRAVENOUS EVERY 6 HOURS PRN
Status: ACTIVE | OUTPATIENT
Start: 2019-09-15 | End: 2019-09-18

## 2019-09-15 RX ORDER — LORAZEPAM 0.5 MG/1
2 TABLET ORAL EVERY 6 HOURS
Status: DISPENSED | OUTPATIENT
Start: 2019-09-15 | End: 2019-09-16

## 2019-09-15 RX ORDER — LORAZEPAM 0.5 MG/1
1 TABLET ORAL EVERY 6 HOURS
Status: DISPENSED | OUTPATIENT
Start: 2019-09-16 | End: 2019-09-18

## 2019-09-15 RX ORDER — OXYCODONE AND ACETAMINOPHEN 10; 325 MG/1; MG/1
1 TABLET ORAL EVERY 4 HOURS PRN
Status: DISCONTINUED | OUTPATIENT
Start: 2019-09-15 | End: 2019-09-19 | Stop reason: HOSPADM

## 2019-09-15 RX ORDER — FOLIC ACID 1 MG/1
1 TABLET ORAL DAILY
Status: DISCONTINUED | OUTPATIENT
Start: 2019-09-16 | End: 2019-09-19 | Stop reason: HOSPADM

## 2019-09-15 RX ADMIN — LORAZEPAM 2 MG: 0.5 TABLET ORAL at 05:09

## 2019-09-15 RX ADMIN — OXYCODONE HYDROCHLORIDE AND ACETAMINOPHEN 1 TABLET: 10; 325 TABLET ORAL at 04:09

## 2019-09-15 RX ADMIN — OXYCODONE HYDROCHLORIDE 5 MG: 5 TABLET ORAL at 08:09

## 2019-09-15 RX ADMIN — GUAIFENESIN AND DEXTROMETHORPHAN HYDROBROMIDE 1 TABLET: 600; 30 TABLET, EXTENDED RELEASE ORAL at 05:09

## 2019-09-15 NOTE — PROGRESS NOTES
Pt doing well this morning. Chest tube as no air leak. CXR from yesterday shows no pneumothorax, however CXR from admission wasn't too impressive when compared to CT chest for the extent of his pneumothorax.  - chest tube changed from suction to water seal today  - will plan on doing bronchoscopy with BAL and possible biopsies tomorrow morning  - npo at midnight  - not on any AC  - further recommendations to follow tomorrow    Brian Dumont MD  [facility]/critical care fellow

## 2019-09-15 NOTE — PROGRESS NOTES
"[facility] Hospital Medicine  Progress Note    Patient Name: Ki Chacon  MRN: [id number]  Patient Class: IP- Inpatient   Admission Date: 9/14/2019  Length of Stay: 1 days  Attending Physician: Vadim Javed, *  Primary Care Provider: [facility] Medicine Team: [facility] MED F Danielle Abarca PA-C    Subjective:     Principal Problem:Primary spontaneous pneumothorax        HPI:  Patient is a 54yo male with a PMHx of emphysema being admitted to medicine for spontaneous pneumothorax. Patient reports onset of chest pain and shortness of breath over the last several days. He first noticed his cough over the last year and it has progressively worsened with dyspnea on exertion. He has found it more difficult to ambulate or do any of his ADLs. He reports associated cough with sputum, fevers, nightsweats, and 20 pound weight loss in the last several months. He reports traveling as an  for work, but was recently in [place] in July for work and was admitted to the hospital for workup of hemoptysis and shortness of breath. At that time, he was found to have a cavitary lesion in his left lung. He reports an extensive workup with sputum samples that was negative for TB, but was diagnosed with a "bacteria that is not found in the [place]". He advised to contact his doctor at previous hospital for further information. He returned from [place] and has been unable to help care for his wife who was hospitalized for a hip replacement due to his illness. He reports smoking 1 pack per day for 35 years. He denies travel outside of the [place].    Of note, patient has a history of recurrent pneumothorax since he broke two ribs in 1995. He had another traumatic event in 2005 that resulted in another pneumothorax. Since that time, he has had several "crackles" in his lung that he has managed on his own.     In the ED, vitals stable on 4L O2. Intake labs remarkable for alcohol 289. CTA " chest showed left apical cavitary lesion with peripheral nodular thickening measuring approximately 3.9 x 2.9 cm. Additional left upper lobe spiculated pulmonary nodule measuring 1.3 cm, right lower lobe ground-glass opacity; right apical cavitary nodule measuring 1.5 cm; Right upper lobe solid nodule measuring 0.9 cm; Upper lobe predominant centrilobular and paraseptal emphysema as well as bronchiectasis. Pulmonology consulted and placed a chest tube for pneumothorax treatment, cultures obtained.    Overview/Hospital Course:  Patient admitted to medicine for spontaneous pneumothorax. Pulmonology consulted, chest tube placed. Bronchoscopy on 9/16 to biopsy cavitary lesions seen.    Interval History: Patient visibly uncomfortable from pain today. Will modify pain schedule. Wife at bedside, all questions answered, no other complaints.    Review of Systems   Constitutional: Positive for chills, diaphoresis, fever and unexpected weight change.   Respiratory: Positive for cough, chest tightness and shortness of breath. Negative for wheezing.    Cardiovascular: Positive for chest pain. Negative for palpitations.   Gastrointestinal: Negative for abdominal pain and nausea.   Skin: Negative for color change and rash.   Neurological: Negative for weakness and light-headedness.   Psychiatric/Behavioral: Negative for agitation and confusion.     Objective:     Vital Signs (Most Recent):  Temp: 98 °F (36.7 °C) (09/15/19 1448)  Pulse: 85 (09/15/19 1448)  Resp: 18 (09/15/19 1448)  BP: 114/70 (09/15/19 1448)  SpO2: 100 % (09/15/19 1448) Vital Signs (24h Range):  Temp:  [97.3 °F (36.3 °C)-98.6 °F (37 °C)] 98 °F (36.7 °C)  Pulse:  [67-93] 85  Resp:  [16-18] 18  SpO2:  [98 %-100 %] 100 %  BP: (114-137)/(68-92) 114/70     Weight: 48.4 kg (106 lb 11.2 oz)  Body mass index is 15.31 kg/m².    Intake/Output Summary (Last 24 hours) at 9/15/2019 1603  Last data filed at 9/15/2019 1200  Gross per 24 hour   Intake 1080 ml   Output 600 ml   Net  480 ml      Physical Exam   Constitutional: He is oriented to person, place, and time. He appears well-nourished. He appears cachectic. No distress. Nasal cannula in place.   Cardiovascular: Normal rate and regular rhythm.   Pulmonary/Chest: Accessory muscle usage present. No respiratory distress. He has decreased breath sounds (diffuse, improving). He has no wheezes.   Abdominal: Soft. Bowel sounds are normal.   Musculoskeletal: He exhibits no edema or tenderness.   Neurological: He is alert and oriented to person, place, and time.   Skin: Skin is warm and dry. Capillary refill takes less than 2 seconds.   Psychiatric: He has a normal mood and affect. His behavior is normal.   Nursing note and vitals reviewed.      Significant Labs:   BMP:   Recent Labs   Lab 09/15/19  0612   GLU 67*      K 4.5   CL 98   CO2 31*   BUN 10   CREATININE 0.6   CALCIUM 9.8     CBC:   Recent Labs   Lab 09/14/19  0830 09/15/19  0612   WBC 8.50 7.06   HGB 14.2 14.3   HCT 44.2 45.5    176     All pertinent labs within the past 24 hours have been reviewed.    Significant Imaging: I have reviewed all pertinent imaging results/findings within the past 24 hours.      Assessment/Plan:      * Primary spontaneous pneumothorax  Cavitary lesion of lung    Patient presents with recurrent cough, shortness of breath and was found to have pneumothorax on CTA. CTA showed left apical cavitary lesion with peripheral nodular thickening measuring approximately 3.9 x 2.9 cm;  Additional left upper lobe spiculated pulmonary nodule measuring 1.3 cm; Right lower lobe ground-glass opacity; Right apical cavitary nodule measuring 1.5 cm;  Right upper lobe solid nodule measuring 0.9 cm. Upper lobe predominant centrilobular and paraseptal emphysema as well as bronchiectasis.  - currently afebrile without leukocytosis  - Pulmonology consulted, chest tube placed to suction  - NPO at [facility] for bronchoscopy with biopsy  - cultures obtained, follow  - airborne  isolation precautions  - placed on 4L NC  - CXR with resolution of pneumothorax, chest tube placed in water  - additional pulm recs pending for eval of cavitary lesions    Alcohol dependence  - serum EtOH 289 on presentation  - reports drinking daily 6 pack of beer since he was a child  - has gone days without drinking and not experienced withdrawal  - start scheduled ativan taper, see order  - CIWA    VTE Risk Mitigation (From admission, onward)        Ordered     Place ALFA hose  Until discontinued      09/14/19 1454     Place sequential compression device  Until discontinued      09/14/19 1454     IP VTE LOW RISK PATIENT  Once      09/14/19 1454                Danielle Abarca PA-C  Department of Hospital Medicine   [facility]

## 2019-09-15 NOTE — ASSESSMENT & PLAN NOTE
- serum EtOH 289 on presentation  - reports drinking daily 6 pack of beer since he was a child  - has gone days without drinking and not experienced withdrawal  - start scheduled ativan taper, see order  - KERIWA

## 2019-09-15 NOTE — PLAN OF CARE
Problem: Adult Inpatient Plan of Care  Goal: Plan of Care Review  Outcome: Ongoing (interventions implemented as appropriate)  Continues airborne precautions. Left mediastinal chest tube in place to water seal.Secured with occlusive gauze  drsg; cdi. No drainage to canister.  Respirations unlabored. No SOB/cough present. 4 LPM in progress via nc to maintain 02 sats > 92%. CIWA wnl at present time.No distress noted.

## 2019-09-15 NOTE — HOSPITAL COURSE
Patient admitted to medicine for spontaneous pneumothorax. Pulmonology consulted, chest tube placed. Started on ativan taper for alcohol withdrawal, as well as thiamine, folate. Bronchoscopy completed with lavage of MAINOR cavitary lesion. Concern for malignancy or nontuberculous mycobacteria. Bacterial respiratory culture with NGTD. Fungal culture positive for candida albicans yeast, likely representing oral/upperairway colonization. Quantiferon gold negative. AFB stain negative, AFB culture pending. Pathology pending. ID consulted, serologies for blastomycosis, histoplasmosis, and cryptococcus sent. HIV negative. Chest tube removed, repeat CXR without reoccurrence of pneumothorax. Patient is at high risk for spontaneous pneumothorax and hemoptysis in future. Patient has no insurance ([place] medicare pending), referral for  [facility] given. Patient to follow up infectious disease, referral given. ID to follow up cultures. Patient discharged on thiamine and folate. Patient verbalized understanding. All questions answered.

## 2019-09-15 NOTE — SUBJECTIVE & OBJECTIVE
Interval History: Patient visibly uncomfortable from pain today. Will modify pain schedule. Wife at bedside, all questions answered, no other complaints.    Review of Systems   Constitutional: Positive for chills, diaphoresis, fever and unexpected weight change.   Respiratory: Positive for cough, chest tightness and shortness of breath. Negative for wheezing.    Cardiovascular: Positive for chest pain. Negative for palpitations.   Gastrointestinal: Negative for abdominal pain and nausea.   Skin: Negative for color change and rash.   Neurological: Negative for weakness and light-headedness.   Psychiatric/Behavioral: Negative for agitation and confusion.     Objective:     Vital Signs (Most Recent):  Temp: 98 °F (36.7 °C) (09/15/19 1448)  Pulse: 85 (09/15/19 1448)  Resp: 18 (09/15/19 1448)  BP: 114/70 (09/15/19 1448)  SpO2: 100 % (09/15/19 1448) Vital Signs (24h Range):  Temp:  [97.3 °F (36.3 °C)-98.6 °F (37 °C)] 98 °F (36.7 °C)  Pulse:  [67-93] 85  Resp:  [16-18] 18  SpO2:  [98 %-100 %] 100 %  BP: (114-137)/(68-92) 114/70     Weight: 48.4 kg (106 lb 11.2 oz)  Body mass index is 15.31 kg/m².    Intake/Output Summary (Last 24 hours) at 9/15/2019 1603  Last data filed at 9/15/2019 1200  Gross per 24 hour   Intake 1080 ml   Output 600 ml   Net 480 ml      Physical Exam   Constitutional: He is oriented to person, place, and time. He appears well-nourished. He appears cachectic. No distress. Nasal cannula in place.   Cardiovascular: Normal rate and regular rhythm.   Pulmonary/Chest: Accessory muscle usage present. No respiratory distress. He has decreased breath sounds (diffuse, improving). He has no wheezes.   Abdominal: Soft. Bowel sounds are normal.   Musculoskeletal: He exhibits no edema or tenderness.   Neurological: He is alert and oriented to person, place, and time.   Skin: Skin is warm and dry. Capillary refill takes less than 2 seconds.   Psychiatric: He has a normal mood and affect. His behavior is normal.    Nursing note and vitals reviewed.      Significant Labs:   BMP:   Recent Labs   Lab 09/15/19  0612   GLU 67*      K 4.5   CL 98   CO2 31*   BUN 10   CREATININE 0.6   CALCIUM 9.8     CBC:   Recent Labs   Lab 09/14/19  0830 09/15/19  0612   WBC 8.50 7.06   HGB 14.2 14.3   HCT 44.2 45.5    176     All pertinent labs within the past 24 hours have been reviewed.    Significant Imaging: I have reviewed all pertinent imaging results/findings within the past 24 hours.

## 2019-09-15 NOTE — ASSESSMENT & PLAN NOTE
Cavitary lesion of lung    Patient presents with recurrent cough, shortness of breath and was found to have pneumothorax on CTA. CTA showed left apical cavitary lesion with peripheral nodular thickening measuring approximately 3.9 x 2.9 cm;  Additional left upper lobe spiculated pulmonary nodule measuring 1.3 cm; Right lower lobe ground-glass opacity; Right apical cavitary nodule measuring 1.5 cm;  Right upper lobe solid nodule measuring 0.9 cm. Upper lobe predominant centrilobular and paraseptal emphysema as well as bronchiectasis.  - currently afebrile without leukocytosis  - Pulmonology consulted, chest tube placed to suction  - NPO at [facility] for bronchoscopy with biopsy  - cultures obtained, follow  - airborne isolation precautions  - placed on 4L NC  - CXR with resolution of pneumothorax, chest tube placed in water  - additional pulm recs pending for eval of cavitary lesions

## 2019-09-15 NOTE — PLAN OF CARE
Problem: Adult Inpatient Plan of Care  Goal: Plan of Care Review  Outcome: Ongoing (interventions implemented as appropriate)  Patient AAOx4. Vitals WNL. Pain managed with prn oxycodone. Chest tube remains intact, connected to continuous wall suction at 20; insertion site remains clean and intact; no output noted this shift. Remains on 4L of O2 per nasal cannula. Patient able to make needs known to staff. Bed in lowest position. Call light within reach. Monitoring.

## 2019-09-16 LAB
ANION GAP SERPL CALC-SCNC: 7 MMOL/L (ref 8–16)
APPEARANCE FLD: NORMAL
BACTERIA SPEC AEROBE CULT: NORMAL
BACTERIA SPEC AEROBE CULT: NORMAL
BASOPHILS # BLD AUTO: 0.04 K/UL (ref 0–0.2)
BASOPHILS NFR BLD: 0.8 % (ref 0–1.9)
BODY FLD TYPE: NORMAL
BUN SERPL-MCNC: 10 MG/DL (ref 6–20)
CALCIUM SERPL-MCNC: 9.6 MG/DL (ref 8.7–10.5)
CHLORIDE SERPL-SCNC: 99 MMOL/L (ref 95–110)
CO2 SERPL-SCNC: 30 MMOL/L (ref 23–29)
COLOR FLD: COLORLESS
CREAT SERPL-MCNC: 0.6 MG/DL (ref 0.5–1.4)
DIFFERENTIAL METHOD: ABNORMAL
EOSINOPHIL # BLD AUTO: 0.3 K/UL (ref 0–0.5)
EOSINOPHIL NFR BLD: 5.8 % (ref 0–8)
ERYTHROCYTE [DISTWIDTH] IN BLOOD BY AUTOMATED COUNT: 14.5 % (ref 11.5–14.5)
EST. GFR  (AFRICAN AMERICAN): >60 ML/MIN/1.73 M^2
EST. GFR  (NON AFRICAN AMERICAN): >60 ML/MIN/1.73 M^2
GLUCOSE SERPL-MCNC: 96 MG/DL (ref 70–110)
GRAM STN SPEC: NORMAL
HCT VFR BLD AUTO: 43.1 % (ref 40–54)
HGB BLD-MCNC: 13.8 G/DL (ref 14–18)
IMM GRANULOCYTES # BLD AUTO: 0.01 K/UL (ref 0–0.04)
IMM GRANULOCYTES NFR BLD AUTO: 0.2 % (ref 0–0.5)
KOH PREP SPEC: NORMAL
LYMPHOCYTES # BLD AUTO: 1.5 K/UL (ref 1–4.8)
LYMPHOCYTES NFR BLD: 28.3 % (ref 18–48)
LYMPHOCYTES NFR FLD MANUAL: 25 %
MCH RBC QN AUTO: 32.8 PG (ref 27–31)
MCHC RBC AUTO-ENTMCNC: 32 G/DL (ref 32–36)
MCV RBC AUTO: 102 FL (ref 82–98)
MONOCYTES # BLD AUTO: 0.7 K/UL (ref 0.3–1)
MONOCYTES NFR BLD: 13 % (ref 4–15)
MONOS+MACROS NFR FLD MANUAL: 61 %
NEUTROPHILS # BLD AUTO: 2.7 K/UL (ref 1.8–7.7)
NEUTROPHILS NFR BLD: 51.9 % (ref 38–73)
NEUTROPHILS NFR FLD MANUAL: 14 %
NRBC BLD-RTO: 0 /100 WBC
PLATELET # BLD AUTO: 176 K/UL (ref 150–350)
PMV BLD AUTO: 11 FL (ref 9.2–12.9)
POTASSIUM SERPL-SCNC: 4.2 MMOL/L (ref 3.5–5.1)
RBC # BLD AUTO: 4.21 M/UL (ref 4.6–6.2)
SODIUM SERPL-SCNC: 136 MMOL/L (ref 136–145)
WBC # BLD AUTO: 5.15 K/UL (ref 3.9–12.7)
WBC # FLD: 185 /CU MM

## 2019-09-16 PROCEDURE — 87070 CULTURE OTHR SPECIMN AEROBIC: CPT

## 2019-09-16 PROCEDURE — 88312 SPECIAL STAINS GROUP 1: CPT | Mod: 26,,, | Performed by: PATHOLOGY

## 2019-09-16 PROCEDURE — 86480 TB TEST CELL IMMUN MEASURE: CPT

## 2019-09-16 PROCEDURE — 85025 COMPLETE CBC W/AUTO DIFF WBC: CPT

## 2019-09-16 PROCEDURE — 89051 BODY FLUID CELL COUNT: CPT

## 2019-09-16 PROCEDURE — 11000001 HC ACUTE MED/SURG PRIVATE ROOM

## 2019-09-16 PROCEDURE — 88305 TISSUE EXAM BY PATHOLOGIST: CPT | Performed by: PATHOLOGY

## 2019-09-16 PROCEDURE — 99233 SBSQ HOSP IP/OBS HIGH 50: CPT | Mod: ,,, | Performed by: PHYSICIAN ASSISTANT

## 2019-09-16 PROCEDURE — 88312 SPECIAL STAINS GROUP 1: CPT | Performed by: PATHOLOGY

## 2019-09-16 PROCEDURE — 99153 MOD SED SAME PHYS/QHP EA: CPT | Performed by: INTERNAL MEDICINE

## 2019-09-16 PROCEDURE — 87116 MYCOBACTERIA CULTURE: CPT

## 2019-09-16 PROCEDURE — 80048 BASIC METABOLIC PNL TOTAL CA: CPT

## 2019-09-16 PROCEDURE — 88312 CYTOLOGY SPECIMEN- MEDICAL CYTOLOGY (FLUID/WASH/BRUSH): ICD-10-PCS | Mod: 26,,, | Performed by: PATHOLOGY

## 2019-09-16 PROCEDURE — 36415 COLL VENOUS BLD VENIPUNCTURE: CPT

## 2019-09-16 PROCEDURE — 31624 DX BRONCHOSCOPE/LAVAGE: CPT | Performed by: INTERNAL MEDICINE

## 2019-09-16 PROCEDURE — 87210 SMEAR WET MOUNT SALINE/INK: CPT

## 2019-09-16 PROCEDURE — 87205 SMEAR GRAM STAIN: CPT

## 2019-09-16 PROCEDURE — 25000003 PHARM REV CODE 250: Performed by: PHYSICIAN ASSISTANT

## 2019-09-16 PROCEDURE — 87305 ASPERGILLUS AG IA: CPT

## 2019-09-16 PROCEDURE — 88112 CYTOPATH CELL ENHANCE TECH: CPT | Mod: 26,,, | Performed by: PATHOLOGY

## 2019-09-16 PROCEDURE — 88112 CYTOLOGY SPECIMEN- MEDICAL CYTOLOGY (FLUID/WASH/BRUSH): ICD-10-PCS | Mod: 26,,, | Performed by: PATHOLOGY

## 2019-09-16 PROCEDURE — 87206 SMEAR FLUORESCENT/ACID STAI: CPT

## 2019-09-16 PROCEDURE — 99233 PR SUBSEQUENT HOSPITAL CARE,LEVL III: ICD-10-PCS | Mod: ,,, | Performed by: PHYSICIAN ASSISTANT

## 2019-09-16 PROCEDURE — 63600175 PHARM REV CODE 636 W HCPCS: Performed by: INTERNAL MEDICINE

## 2019-09-16 PROCEDURE — 25000003 PHARM REV CODE 250: Performed by: INTERNAL MEDICINE

## 2019-09-16 PROCEDURE — 87449 NOS EACH ORGANISM AG IA: CPT

## 2019-09-16 PROCEDURE — 99152 MOD SED SAME PHYS/QHP 5/>YRS: CPT | Performed by: INTERNAL MEDICINE

## 2019-09-16 PROCEDURE — 87015 SPECIMEN INFECT AGNT CONCNTJ: CPT

## 2019-09-16 PROCEDURE — 88305 CYTOLOGY SPECIMEN- MEDICAL CYTOLOGY (FLUID/WASH/BRUSH): ICD-10-PCS | Mod: 26,,, | Performed by: PATHOLOGY

## 2019-09-16 PROCEDURE — 88305 TISSUE EXAM BY PATHOLOGIST: CPT | Mod: 26,,, | Performed by: PATHOLOGY

## 2019-09-16 PROCEDURE — 87102 FUNGUS ISOLATION CULTURE: CPT

## 2019-09-16 RX ORDER — HYDROMORPHONE HYDROCHLORIDE 1 MG/ML
INJECTION, SOLUTION INTRAMUSCULAR; INTRAVENOUS; SUBCUTANEOUS CODE/TRAUMA/SEDATION MEDICATION
Status: COMPLETED | OUTPATIENT
Start: 2019-09-16 | End: 2019-09-16

## 2019-09-16 RX ORDER — MIDAZOLAM HYDROCHLORIDE 5 MG/ML
INJECTION INTRAMUSCULAR; INTRAVENOUS CODE/TRAUMA/SEDATION MEDICATION
Status: COMPLETED | OUTPATIENT
Start: 2019-09-16 | End: 2019-09-16

## 2019-09-16 RX ORDER — FENTANYL CITRATE 50 UG/ML
INJECTION, SOLUTION INTRAMUSCULAR; INTRAVENOUS CODE/TRAUMA/SEDATION MEDICATION
Status: COMPLETED | OUTPATIENT
Start: 2019-09-16 | End: 2019-09-16

## 2019-09-16 RX ORDER — LIDOCAINE HYDROCHLORIDE 20 MG/ML
SOLUTION OROPHARYNGEAL CODE/TRAUMA/SEDATION MEDICATION
Status: COMPLETED | OUTPATIENT
Start: 2019-09-16 | End: 2019-09-16

## 2019-09-16 RX ORDER — LIDOCAINE HYDROCHLORIDE 10 MG/ML
INJECTION INFILTRATION; PERINEURAL CODE/TRAUMA/SEDATION MEDICATION
Status: COMPLETED | OUTPATIENT
Start: 2019-09-16 | End: 2019-09-16

## 2019-09-16 RX ORDER — LIDOCAINE HYDROCHLORIDE 20 MG/ML
INJECTION, SOLUTION INFILTRATION; PERINEURAL CODE/TRAUMA/SEDATION MEDICATION
Status: COMPLETED | OUTPATIENT
Start: 2019-09-16 | End: 2019-09-16

## 2019-09-16 RX ADMIN — MIDAZOLAM HYDROCHLORIDE 3 MG: 5 INJECTION, SOLUTION INTRAMUSCULAR; INTRAVENOUS at 10:09

## 2019-09-16 RX ADMIN — FENTANYL CITRATE 75 MCG: 50 INJECTION, SOLUTION INTRAMUSCULAR; INTRAVENOUS at 10:09

## 2019-09-16 RX ADMIN — THERA TABS 1 TABLET: TAB at 09:09

## 2019-09-16 RX ADMIN — OXYCODONE HYDROCHLORIDE AND ACETAMINOPHEN 1 TABLET: 10; 325 TABLET ORAL at 08:09

## 2019-09-16 RX ADMIN — LIDOCAINE HYDROCHLORIDE 5 ML: 20 SOLUTION OROPHARYNGEAL at 10:09

## 2019-09-16 RX ADMIN — LIDOCAINE HYDROCHLORIDE 8 ML: 10 INJECTION, SOLUTION INFILTRATION; PERINEURAL at 10:09

## 2019-09-16 RX ADMIN — TOPICAL ANESTHETIC 0.5 ML: 200 SPRAY DENTAL; PERIODONTAL at 10:09

## 2019-09-16 RX ADMIN — GUAIFENESIN AND DEXTROMETHORPHAN HYDROBROMIDE 1 TABLET: 600; 30 TABLET, EXTENDED RELEASE ORAL at 08:09

## 2019-09-16 RX ADMIN — HYDROMORPHONE HYDROCHLORIDE 1 MG: 1 INJECTION, SOLUTION INTRAMUSCULAR; INTRAVENOUS; SUBCUTANEOUS at 10:09

## 2019-09-16 RX ADMIN — LORAZEPAM 1 MG: 0.5 TABLET ORAL at 11:09

## 2019-09-16 RX ADMIN — MIDAZOLAM HYDROCHLORIDE 1 MG: 5 INJECTION, SOLUTION INTRAMUSCULAR; INTRAVENOUS at 10:09

## 2019-09-16 RX ADMIN — FOLIC ACID 1 MG: 1 TABLET ORAL at 09:09

## 2019-09-16 RX ADMIN — LIDOCAINE HYDROCHLORIDE 6 ML: 20 INJECTION, SOLUTION INFILTRATION; PERINEURAL at 10:09

## 2019-09-16 RX ADMIN — FENTANYL CITRATE 25 MCG: 50 INJECTION, SOLUTION INTRAMUSCULAR; INTRAVENOUS at 10:09

## 2019-09-16 RX ADMIN — LORAZEPAM 2 MG: 0.5 TABLET ORAL at 12:09

## 2019-09-16 RX ADMIN — LORAZEPAM 2 MG: 0.5 TABLET ORAL at 07:09

## 2019-09-16 NOTE — INTERVAL H&P NOTE
The patient has been examined and the H&P has been reviewed:    I concur with the findings and no changes have occurred since H&P was written.        Active Hospital Problems    Diagnosis  POA    *Primary spontaneous pneumothorax [J93.11]  Yes    Alcohol dependence [F10.20]  Yes    Cavitary lesion of lung [J98.4]  Yes      Resolved Hospital Problems   No resolved problems to display.     Patient has been consented for bronchoscopy ASA 3, mallampatti 1  I have explained the risks, benefits and alternatives of the procedure in detail.  The patient voices understanding and all questions have been answered.  The patient agrees to proceed as planned.  No personal or family history of anesthesia complications.  Drinks a 6 pack of beer daily.

## 2019-09-16 NOTE — PLAN OF CARE
Problem: Adult Inpatient Plan of Care  Goal: Plan of Care Review  Outcome: Ongoing (interventions implemented as appropriate)  Will continue to monitor pt's safety, s/s of infection, skin integrity, and pain level.

## 2019-09-16 NOTE — SEDATION DOCUMENTATION
Specimens obtained during Bronchoscopy:  BAL MAINOR 120 ml nacl in 25 ml return.  Verbal report given to [facility]  to include documentation charted in procedural sedation documentation.  Patient to be NPO 1 hour post procedure and place in PO tolerance at 1115.  Moderate concious sedation was performed and cardiorespiratory functions were monitored the entire procedure by Jody Cobb RN.  Sedation began at 1007  and concluded at 1035.  The patient tolerated the procedure well.  Jody Cobb RN

## 2019-09-16 NOTE — PLAN OF CARE
to patient room to obtain discharge planning assessment.  Patient currently having bronchoscopy done.  Patient's spouse at the bedside.  Information obtained from patient's spouse.  Patient's transportation home will be provided by his spouse.   name and number written on the board at the bedside.    Payor: /  Medicaid pending     09/16/19 1000   Discharge Assessment   Assessment Type Discharge Planning Assessment   Confirmed/corrected address and phone number on facesheet? Yes   Assessment information obtained from? Caregiver   Expected Length of Stay (days) 3   Communicated expected length of stay with patient/caregiver yes   Prior to hospitilization cognitive status: Alert/Oriented   Prior to hospitalization functional status: Independent   Current cognitive status: Alert/Oriented   Current Functional Status: Independent   Lives With spouse   Able to Return to Prior Arrangements yes   Is patient able to care for self after discharge? Yes   Who are your caregiver(s) and their phone number(s)? Izabel Chacon-Spouse    [phone]   Patient's perception of discharge disposition home or selfcare   Readmission Within the Last 30 Days no previous admission in last 30 days   Patient currently being followed by outpatient case management? No   Patient currently receives any other outside agency services? No   Equipment Currently Used at Home none   Do you have any problems affording any of your prescribed medications? TBD   Is the patient taking medications as prescribed? yes   Does the patient have transportation home? Yes   Transportation Anticipated family or friend will provide   Dialysis Name and Scheduled days N/A   Does the patient receive services at the [facility]? No   Discharge Plan A Home   Discharge Plan B Home with family   DME Needed Upon Discharge    (TBD)   Patient/Family in Agreement with Plan yes

## 2019-09-16 NOTE — ASSESSMENT & PLAN NOTE
Cavitary lesion of lung    Patient presents with recurrent cough, shortness of breath and was found to have pneumothorax on CTA. CTA showed left apical cavitary lesion with peripheral nodular thickening measuring approximately 3.9 x 2.9 cm;  Additional left upper lobe spiculated pulmonary nodule measuring 1.3 cm; Right lower lobe ground-glass opacity; Right apical cavitary nodule measuring 1.5 cm;  Right upper lobe solid nodule measuring 0.9 cm. Upper lobe predominant centrilobular and paraseptal emphysema as well as bronchiectasis.  - currently afebrile without leukocytosis  - Pulmonology consulted, chest tube placed to suction  - bronchoscopy completed, biopsies, flow cytometry, cell count, and cultures pending  - airborne isolation precautions  - placed on 4L NC  - CXR with resolution of pneumothorax, chest tube placed in water  - likely home in 1-2 days pending removal of chest tube and follow up of cultures

## 2019-09-16 NOTE — SEDATION DOCUMENTATION
H and P updated-yes, patient placed on cardiac monitor, anesthesia Plan:  Conscious sedation, ASA verified-yes, Airway exam performed-yes, Personal or Family history of anesthesia complications-No  Consent signed and witnessed, Jody Cobb RN

## 2019-09-16 NOTE — PROGRESS NOTES
"[facility] Hospital Medicine  Progress Note    Patient Name: Ki Chacon  MRN: [id number]  Patient Class: IP- Inpatient   Admission Date: 9/14/2019  Length of Stay: 2 days  Attending Physician: Vadim Javed, *  Primary Care Provider: Primary Doctor [facility] Medicine Team: [facility] MED F Danielle Abarca PA-C    Subjective:     Principal Problem:Primary spontaneous pneumothorax        HPI:  Patient is a 52yo male with a PMHx of emphysema being admitted to medicine for spontaneous pneumothorax. Patient reports onset of chest pain and shortness of breath over the last several days. He first noticed his cough over the last year and it has progressively worsened with dyspnea on exertion. He has found it more difficult to ambulate or do any of his ADLs. He reports associated cough with sputum, fevers, nightsweats, and 20 pound weight loss in the last several months. He reports traveling as an  for work, but was recently in [place] in July for work and was admitted to the hospital for workup of hemoptysis and shortness of breath. At that time, he was found to have a cavitary lesion in his left lung. He reports an extensive workup with sputum samples that was negative for TB, but was diagnosed with a "bacteria that is not found in the [place]". He advised to contact his doctor at previous hospital for further information. He returned from [place] and has been unable to help care for his wife who was hospitalized for a hip replacement due to his illness. He reports smoking 1 pack per day for 35 years. He denies travel outside of the [place].    Of note, patient has a history of recurrent pneumothorax since he broke two ribs in 1995. He had another traumatic event in 2005 that resulted in another pneumothorax. Since that time, he has had several "crackles" in his lung that he has managed on his own.     In the ED, vitals stable on 4L O2. Intake labs remarkable for alcohol 289. CTA " chest showed left apical cavitary lesion with peripheral nodular thickening measuring approximately 3.9 x 2.9 cm. Additional left upper lobe spiculated pulmonary nodule measuring 1.3 cm, right lower lobe ground-glass opacity; right apical cavitary nodule measuring 1.5 cm; Right upper lobe solid nodule measuring 0.9 cm; Upper lobe predominant centrilobular and paraseptal emphysema as well as bronchiectasis. Pulmonology consulted and placed a chest tube for pneumothorax treatment, cultures obtained.    Overview/Hospital Course:  Patient admitted to medicine for spontaneous pneumothorax. Pulmonology consulted, chest tube placed. Bronchoscopy completed with lavage of MAINOR cavitary lesion. Biopsies flow cytometry, cell count, viral, bacterial, and fungal cultures sent. Pending stabilization, likely home in 1-2 days when chest tube is removed. He has no insurance, so will need follow up with [facility]. His [place] Medicaid is pending.    Interval History: Pain better controlled overnight. Went for Bronch today. No additional complaints.    Review of Systems   Constitutional: Positive for chills, diaphoresis, fever (resolved) and unexpected weight change.   Respiratory: Positive for cough (improving), chest tightness and shortness of breath (improving). Negative for wheezing.    Cardiovascular: Positive for chest pain. Negative for palpitations.   Gastrointestinal: Negative for abdominal pain and nausea.   Skin: Negative for color change and rash.   Neurological: Negative for weakness and light-headedness.   Psychiatric/Behavioral: Negative for agitation and confusion.     Objective:     Vital Signs (Most Recent):  Temp: 97.5 °F (36.4 °C) (09/16/19 1149)  Pulse: 79 (09/16/19 1149)  Resp: 18 (09/16/19 1149)  BP: 108/69 (09/16/19 1149)  SpO2: 100 % (09/16/19 1149) Vital Signs (24h Range):  Temp:  [97.5 °F (36.4 °C)-98 °F (36.7 °C)] 97.5 °F (36.4 °C)  Pulse:  [] 79  Resp:  [10-22] 18  SpO2:  [93 %-100 %] 100  %  BP: (105-140)/() 108/69     Weight: 48.4 kg (106 lb 11.2 oz)  Body mass index is 15.31 kg/m².    Intake/Output Summary (Last 24 hours) at 9/16/2019 1446  Last data filed at 9/16/2019 0759  Gross per 24 hour   Intake 240 ml   Output 0 ml   Net 240 ml      Physical Exam   Constitutional: He is oriented to person, place, and time. He appears well-nourished. He appears cachectic. No distress. Nasal cannula in place.   Cardiovascular: Normal rate and regular rhythm.   Pulmonary/Chest: Accessory muscle usage present. No respiratory distress. He has decreased breath sounds (diffuse, improving). He has no wheezes.   Abdominal: Soft. Bowel sounds are normal.   Musculoskeletal: He exhibits no edema or tenderness.   Neurological: He is alert and oriented to person, place, and time.   Skin: Skin is warm and dry. Capillary refill takes less than 2 seconds.   Psychiatric: He has a normal mood and affect. His behavior is normal.   Nursing note and vitals reviewed.      Significant Labs:   BMP:   Recent Labs   Lab 09/16/19  0706   GLU 96      K 4.2   CL 99   CO2 30*   BUN 10   CREATININE 0.6   CALCIUM 9.6     CBC:   Recent Labs   Lab 09/15/19  0612 09/16/19  0706   WBC 7.06 5.15   HGB 14.3 13.8*   HCT 45.5 43.1    176     All pertinent labs within the past 24 hours have been reviewed.    Significant Imaging: I have reviewed all pertinent imaging results/findings within the past 24 hours.      Assessment/Plan:      * Primary spontaneous pneumothorax  Cavitary lesion of lung    Patient presents with recurrent cough, shortness of breath and was found to have pneumothorax on CTA. CTA showed left apical cavitary lesion with peripheral nodular thickening measuring approximately 3.9 x 2.9 cm;  Additional left upper lobe spiculated pulmonary nodule measuring 1.3 cm; Right lower lobe ground-glass opacity; Right apical cavitary nodule measuring 1.5 cm;  Right upper lobe solid nodule measuring 0.9 cm. Upper lobe  predominant centrilobular and paraseptal emphysema as well as bronchiectasis.  - currently afebrile without leukocytosis  - Pulmonology consulted, chest tube placed to suction  - bronchoscopy completed, biopsies, flow cytometry, cell count, and cultures pending  - airborne isolation precautions  - placed on 4L NC  - CXR with resolution of pneumothorax, chest tube placed in water  - likely home in 1-2 days pending removal of chest tube and follow up of cultures    Alcohol dependence  - serum EtOH 289 on presentation  - reports drinking daily 6 pack of beer since he was a child  - has gone days without drinking and not experienced withdrawal  - start scheduled ativan taper, see order  - CIWA      VTE Risk Mitigation (From admission, onward)        Ordered     Place ALFA hose  Until discontinued      09/14/19 1454     Place sequential compression device  Until discontinued      09/14/19 1454     IP VTE LOW RISK PATIENT  Once      09/14/19 1454                Danielle Abarca PA-C  Department of Hospital Medicine   [facility]

## 2019-09-16 NOTE — SUBJECTIVE & OBJECTIVE
Interval History: Pain better controlled overnight. Went for Bronch today. No additional complaints.    Review of Systems   Constitutional: Positive for chills, diaphoresis, fever (resolved) and unexpected weight change.   Respiratory: Positive for cough (improving), chest tightness and shortness of breath (improving). Negative for wheezing.    Cardiovascular: Positive for chest pain. Negative for palpitations.   Gastrointestinal: Negative for abdominal pain and nausea.   Skin: Negative for color change and rash.   Neurological: Negative for weakness and light-headedness.   Psychiatric/Behavioral: Negative for agitation and confusion.     Objective:     Vital Signs (Most Recent):  Temp: 97.5 °F (36.4 °C) (09/16/19 1149)  Pulse: 79 (09/16/19 1149)  Resp: 18 (09/16/19 1149)  BP: 108/69 (09/16/19 1149)  SpO2: 100 % (09/16/19 1149) Vital Signs (24h Range):  Temp:  [97.5 °F (36.4 °C)-98 °F (36.7 °C)] 97.5 °F (36.4 °C)  Pulse:  [] 79  Resp:  [10-22] 18  SpO2:  [93 %-100 %] 100 %  BP: (105-140)/() 108/69     Weight: 48.4 kg (106 lb 11.2 oz)  Body mass index is 15.31 kg/m².    Intake/Output Summary (Last 24 hours) at 9/16/2019 1446  Last data filed at 9/16/2019 0759  Gross per 24 hour   Intake 240 ml   Output 0 ml   Net 240 ml      Physical Exam   Constitutional: He is oriented to person, place, and time. He appears well-nourished. He appears cachectic. No distress. Nasal cannula in place.   Cardiovascular: Normal rate and regular rhythm.   Pulmonary/Chest: Accessory muscle usage present. No respiratory distress. He has decreased breath sounds (diffuse, improving). He has no wheezes.   Abdominal: Soft. Bowel sounds are normal.   Musculoskeletal: He exhibits no edema or tenderness.   Neurological: He is alert and oriented to person, place, and time.   Skin: Skin is warm and dry. Capillary refill takes less than 2 seconds.   Psychiatric: He has a normal mood and affect. His behavior is normal.   Nursing note and  vitals reviewed.      Significant Labs:   BMP:   Recent Labs   Lab 09/16/19  0706   GLU 96      K 4.2   CL 99   CO2 30*   BUN 10   CREATININE 0.6   CALCIUM 9.6     CBC:   Recent Labs   Lab 09/15/19  0612 09/16/19  0706   WBC 7.06 5.15   HGB 14.3 13.8*   HCT 45.5 43.1    176     All pertinent labs within the past 24 hours have been reviewed.    Significant Imaging: I have reviewed all pertinent imaging results/findings within the past 24 hours.

## 2019-09-17 LAB
ANION GAP SERPL CALC-SCNC: 6 MMOL/L (ref 8–16)
BASOPHILS # BLD AUTO: 0.05 K/UL (ref 0–0.2)
BASOPHILS NFR BLD: 1 % (ref 0–1.9)
BUN SERPL-MCNC: 8 MG/DL (ref 6–20)
CALCIUM SERPL-MCNC: 9.9 MG/DL (ref 8.7–10.5)
CHLORIDE SERPL-SCNC: 98 MMOL/L (ref 95–110)
CO2 SERPL-SCNC: 32 MMOL/L (ref 23–29)
CREAT SERPL-MCNC: 0.7 MG/DL (ref 0.5–1.4)
DIFFERENTIAL METHOD: ABNORMAL
EOSINOPHIL # BLD AUTO: 0.3 K/UL (ref 0–0.5)
EOSINOPHIL NFR BLD: 5.2 % (ref 0–8)
ERYTHROCYTE [DISTWIDTH] IN BLOOD BY AUTOMATED COUNT: 14.4 % (ref 11.5–14.5)
EST. GFR  (AFRICAN AMERICAN): >60 ML/MIN/1.73 M^2
EST. GFR  (NON AFRICAN AMERICAN): >60 ML/MIN/1.73 M^2
GLUCOSE SERPL-MCNC: 91 MG/DL (ref 70–110)
HCT VFR BLD AUTO: 46.8 % (ref 40–54)
HGB BLD-MCNC: 14.7 G/DL (ref 14–18)
IMM GRANULOCYTES # BLD AUTO: 0.01 K/UL (ref 0–0.04)
IMM GRANULOCYTES NFR BLD AUTO: 0.2 % (ref 0–0.5)
LYMPHOCYTES # BLD AUTO: 2.2 K/UL (ref 1–4.8)
LYMPHOCYTES NFR BLD: 41.8 % (ref 18–48)
MCH RBC QN AUTO: 32.6 PG (ref 27–31)
MCHC RBC AUTO-ENTMCNC: 31.4 G/DL (ref 32–36)
MCV RBC AUTO: 104 FL (ref 82–98)
MONOCYTES # BLD AUTO: 0.7 K/UL (ref 0.3–1)
MONOCYTES NFR BLD: 13.2 % (ref 4–15)
NEUTROPHILS # BLD AUTO: 2 K/UL (ref 1.8–7.7)
NEUTROPHILS NFR BLD: 38.6 % (ref 38–73)
NRBC BLD-RTO: 0 /100 WBC
PLATELET # BLD AUTO: 186 K/UL (ref 150–350)
PMV BLD AUTO: 11.8 FL (ref 9.2–12.9)
POTASSIUM SERPL-SCNC: 4 MMOL/L (ref 3.5–5.1)
RBC # BLD AUTO: 4.51 M/UL (ref 4.6–6.2)
SODIUM SERPL-SCNC: 136 MMOL/L (ref 136–145)
WBC # BLD AUTO: 5.21 K/UL (ref 3.9–12.7)

## 2019-09-17 PROCEDURE — 80048 BASIC METABOLIC PNL TOTAL CA: CPT

## 2019-09-17 PROCEDURE — 99233 SBSQ HOSP IP/OBS HIGH 50: CPT | Mod: ,,, | Performed by: INTERNAL MEDICINE

## 2019-09-17 PROCEDURE — 36415 COLL VENOUS BLD VENIPUNCTURE: CPT

## 2019-09-17 PROCEDURE — 99233 PR SUBSEQUENT HOSPITAL CARE,LEVL III: ICD-10-PCS | Mod: ,,, | Performed by: PHYSICIAN ASSISTANT

## 2019-09-17 PROCEDURE — 99233 PR SUBSEQUENT HOSPITAL CARE,LEVL III: ICD-10-PCS | Mod: ,,, | Performed by: INTERNAL MEDICINE

## 2019-09-17 PROCEDURE — 99233 SBSQ HOSP IP/OBS HIGH 50: CPT | Mod: ,,, | Performed by: PHYSICIAN ASSISTANT

## 2019-09-17 PROCEDURE — 11000001 HC ACUTE MED/SURG PRIVATE ROOM

## 2019-09-17 PROCEDURE — 85025 COMPLETE CBC W/AUTO DIFF WBC: CPT

## 2019-09-17 PROCEDURE — 25000003 PHARM REV CODE 250: Performed by: PHYSICIAN ASSISTANT

## 2019-09-17 RX ADMIN — LORAZEPAM 1 MG: 0.5 TABLET ORAL at 11:09

## 2019-09-17 RX ADMIN — GUAIFENESIN AND DEXTROMETHORPHAN HYDROBROMIDE 1 TABLET: 600; 30 TABLET, EXTENDED RELEASE ORAL at 09:09

## 2019-09-17 RX ADMIN — LORAZEPAM 1 MG: 0.5 TABLET ORAL at 06:09

## 2019-09-17 RX ADMIN — FOLIC ACID 1 MG: 1 TABLET ORAL at 11:09

## 2019-09-17 RX ADMIN — THERA TABS 1 TABLET: TAB at 09:09

## 2019-09-17 NOTE — ASSESSMENT & PLAN NOTE
Cavitary lesion of lung    Patient presents with recurrent cough, shortness of breath and was found to have pneumothorax on CTA. CTA showed left apical cavitary lesion with peripheral nodular thickening measuring approximately 3.9 x 2.9 cm;  Additional left upper lobe spiculated pulmonary nodule measuring 1.3 cm; Right lower lobe ground-glass opacity; Right apical cavitary nodule measuring 1.5 cm;  Right upper lobe solid nodule measuring 0.9 cm. Upper lobe predominant centrilobular and paraseptal emphysema as well as bronchiectasis.  - currently afebrile without leukocytosis  - Pulmonology consulted, chest tube placed to suction  - bronchoscopy completed, biopsies, flow cytometry, cell count, and cultures pending  - respiratory culture no growth  - AFB, fungus in process  - airborne isolation precautions  - placed on 4L NC  - CXR with resolution of pneumothorax, chest tube placed in water  - repeat CXR pending   - likely home in 1-2 days pending removal of chest tube and follow up of cultures

## 2019-09-17 NOTE — PROGRESS NOTES
[facility]  Pulmonology  Progress Note    Patient Name: Ki Chacon  MRN: [id number]  Admission Date: 9/14/2019  Hospital Length of Stay: 3 days  Code Status: Full Code  Attending Provider: Brianda Lacey MD  Primary Care Provider: Primary Doctor No   Principal Problem: Primary spontaneous pneumothorax    Subjective:     Interval History: Patient afebrile and hemodynamically stable. Patient tolerating PO intake without difficulty and ambulating without assistance. 9/16/19 Underwent bronchoscopy with BAL. Cultures pending    Objective:     Vital Signs (Most Recent):  Temp: 97.6 °F (36.4 °C) (09/17/19 1500)  Pulse: 77 (09/17/19 1500)  Resp: 18 (09/17/19 1500)  BP: 105/69 (09/17/19 1500)  SpO2: 97 % (09/17/19 1500) Vital Signs (24h Range):  Temp:  [97.4 °F (36.3 °C)-97.6 °F (36.4 °C)] 97.6 °F (36.4 °C)  Pulse:  [60-77] 77  Resp:  [12-18] 18  SpO2:  [97 %-100 %] 97 %  BP: (105-133)/(69-81) 105/69     Weight: 48.4 kg (106 lb 11.2 oz)  Body mass index is 15.31 kg/m².      Intake/Output Summary (Last 24 hours) at 9/17/2019 1701  Last data filed at 9/17/2019 1600  Gross per 24 hour   Intake 440 ml   Output 200 ml   Net 240 ml       Physical Exam   Constitutional: He is oriented to person, place, and time. No distress.   Patient thin appearing   HENT:   Head: Normocephalic and atraumatic.   Mouth/Throat: No oropharyngeal exudate.   Eyes: Conjunctivae and EOM are normal.   Neck: Normal range of motion. Neck supple.   Cardiovascular: Normal rate and regular rhythm. Exam reveals no gallop and no friction rub.   No murmur heard.  Pulmonary/Chest: Effort normal and breath sounds normal. No respiratory distress. He has no wheezes. He has no rales.   Left pleural catheter in place without drainage or air leak   Abdominal: Soft.   Musculoskeletal: Normal range of motion. He exhibits no deformity.   Lymphadenopathy:     He has no cervical adenopathy.   Neurological: He is oriented to person, place, and time.  "  Skin: Skin is warm and dry. He is not diaphoretic.         Lines/Drains/Airways     Drain                 Chest Tube 09/14/19 1628 1 Left Mediastinal 3 days          Peripheral Intravenous Line                 Peripheral IV - Single Lumen 09/14/19 0828 18 G Left Antecubital 3 days                Significant Labs:    CBC/Anemia Profile:  Recent Labs   Lab 09/16/19  0706 09/17/19  0625   WBC 5.15 5.21   HGB 13.8* 14.7   HCT 43.1 46.8    186   * 104*   RDW 14.5 14.4        Chemistries:  Recent Labs   Lab 09/16/19  0706 09/17/19  0625    136   K 4.2 4.0   CL 99 98   CO2 30* 32*   BUN 10 8   CREATININE 0.6 0.7   CALCIUM 9.6 9.9       All pertinent labs within the past 24 hours have been reviewed.    Assessment/Plan:   Problems:   Primary Spontaneous Pneumothorax - RESOLVED  Recent Pneudomonal Pneumonia (treated with levaquin?? And Doxycycline in [place])  Chest Tube Placement  Bilateral Apical Cavitary Lesions  Alcohol Abuse    Mr. Chacon is a 54 yo male with the above history. He is s/p Bronchoscopy with BAL. 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 The patient reports that he was recently treated in [place] for Pneudomonal pnuemonia; however, he was told by his treating physician that he was positive for a "rare infection" that is " not endemic to [place] or the [place]." He was told that he should "be on doxycycline forever." The patient provided discharge summaries that report being treated with doxycycline and levaquin. Reports also reveal positive heavy growth of Pseudomonas aeruginosa in his sputum that was sensitive to ciprofloxacin; but the patient did not received this tailored therapy. He reports that Dr. Perez has more information to provide to the primary team. It is unlikely " that the patient is infected with TB. Records from OSH negative x 3 for AFB..     Recommendations:  - For the chest tube, the was clamped as there was no air leak on water seal. If there is no re accumulation of air (pneumothorax), will pull the chest tube (will contact the primary team with the tube has been removed)  - Will continue to f/u with BAL cultures  - Quantiferon Gold TB Test pending  - Patient will need pulmonary f/u outpatient (Dr. Gonzalez)  - Obtain more information from Dr. Perez (patient has contact information) in order to clarify diagnosis and history of treatment.   - OOB; PT    Thank you for the consult. Will sign off at this time. Please contact the team with additional and/or new questions.     Leelee Leavitt MD  Pulmonology  [facility]

## 2019-09-17 NOTE — PROGRESS NOTES
"[facility] Hospital Medicine  Progress Note    Patient Name: Ki Chacon  MRN: [id number]  Patient Class: IP- Inpatient   Admission Date: 9/14/2019  Length of Stay: 3 days  Attending Physician: Brianda Lacey MD  Primary Care Provider: [facility] Doctor [facility] Medicine Team: [facility] MED F Sana Watkins PA-C    Subjective:     Principal Problem:Primary spontaneous pneumothorax        HPI:  Patient is a 52yo male with a PMHx of emphysema being admitted to medicine for spontaneous pneumothorax. Patient reports onset of chest pain and shortness of breath over the last several days. He first noticed his cough over the last year and it has progressively worsened with dyspnea on exertion. He has found it more difficult to ambulate or do any of his ADLs. He reports associated cough with sputum, fevers, nightsweats, and 20 pound weight loss in the last several months. He reports traveling as an  for work, but was recently in [place] in July for work and was admitted to the hospital for workup of hemoptysis and shortness of breath. At that time, he was found to have a cavitary lesion in his left lung. He reports an extensive workup with sputum samples that was negative for TB, but was diagnosed with a "bacteria that is not found in the [place]". He advised to contact his doctor at previous hospital for further information. He returned from [place] and has been unable to help care for his wife who was hospitalized for a hip replacement due to his illness. He reports smoking 1 pack per day for 35 years. He denies travel outside of the [place].    Of note, patient has a history of recurrent pneumothorax since he broke two ribs in 1995. He had another traumatic event in 2005 that resulted in another pneumothorax. Since that time, he has had several "crackles" in his lung that he has managed on his own.     In the ED, vitals stable on 4L O2. Intake labs remarkable for alcohol 289. CTA chest " showed left apical cavitary lesion with peripheral nodular thickening measuring approximately 3.9 x 2.9 cm. Additional left upper lobe spiculated pulmonary nodule measuring 1.3 cm, right lower lobe ground-glass opacity; right apical cavitary nodule measuring 1.5 cm; Right upper lobe solid nodule measuring 0.9 cm; Upper lobe predominant centrilobular and paraseptal emphysema as well as bronchiectasis. Pulmonology consulted and placed a chest tube for pneumothorax treatment, cultures obtained.    Overview/Hospital Course:  Patient admitted to medicine for spontaneous pneumothorax. Pulmonology consulted, chest tube placed. Bronchoscopy completed with lavage of MAINOR cavitary lesion. Biopsies flow cytometry, cell count, viral, bacterial, and fungal cultures sent. Pending stabilization, likely home in 1-2 days when chest tube is removed. He has no insurance, so will need follow up with [facility]. His [place] Medicaid is pending.    Interval History: Patient reports improvement in SOB and cough. VSS. Labs unremarkable. Chest tube in place. Blood culture NGTD. Repeat CXR pending. AFB, fungal culture in process. Will attempt to reach Dr. Perez in [place] to see why patient was treated with levaquin and doxy. Pulmonology following. Continue to monitor.     Review of Systems   Constitutional: Positive for chills, diaphoresis and unexpected weight change. Negative for fever.   Respiratory: Positive for cough (improving) and shortness of breath (improving). Negative for chest tightness and wheezing.    Cardiovascular: Positive for chest pain. Negative for palpitations.   Gastrointestinal: Negative for abdominal pain and nausea.   Genitourinary: Negative for difficulty urinating and dysuria.   Musculoskeletal: Negative for arthralgias and back pain.   Skin: Negative for color change and rash.   Neurological: Negative for weakness and light-headedness.   Psychiatric/Behavioral: Negative for agitation and confusion.      Objective:     Vital Signs (Most Recent):  Temp: 97.4 °F (36.3 °C) (09/16/19 2046)  Pulse: 62 (09/17/19 0729)  Resp: 12 (09/17/19 0729)  BP: 118/75 (09/17/19 0729)  SpO2: 99 % (09/17/19 0729) Vital Signs (24h Range):  Temp:  [97.4 °F (36.3 °C)] 97.4 °F (36.3 °C)  Pulse:  [60-76] 62  Resp:  [12-18] 12  SpO2:  [99 %-100 %] 99 %  BP: (118-133)/(75-81) 118/75     Weight: 48.4 kg (106 lb 11.2 oz)  Body mass index is 15.31 kg/m².    Intake/Output Summary (Last 24 hours) at 9/17/2019 1612  Last data filed at 9/17/2019 0729  Gross per 24 hour   Intake 200 ml   Output 0 ml   Net 200 ml      Physical Exam   Constitutional: He is oriented to person, place, and time. He appears well-nourished. He appears cachectic. No distress.   Cardiovascular: Normal rate and regular rhythm.   Pulmonary/Chest: Effort normal. No respiratory distress. He has decreased breath sounds (diffuse, improving). He has no wheezes.   Chest tube in place   Abdominal: Soft. Bowel sounds are normal.   Musculoskeletal: He exhibits no edema or tenderness.   Neurological: He is alert and oriented to person, place, and time.   Skin: Skin is warm and dry. Capillary refill takes less than 2 seconds.   Psychiatric: He has a normal mood and affect. His behavior is normal.   Nursing note and vitals reviewed.      Significant Labs:   CBC:   Recent Labs   Lab 09/16/19  0706 09/17/19  0625   WBC 5.15 5.21   HGB 13.8* 14.7   HCT 43.1 46.8    186     CMP:   Recent Labs   Lab 09/16/19  0706 09/17/19  0625    136   K 4.2 4.0   CL 99 98   CO2 30* 32*   GLU 96 91   BUN 10 8   CREATININE 0.6 0.7   CALCIUM 9.6 9.9   ANIONGAP 7* 6*   EGFRNONAA >60.0 >60.0     Respiratory Culture:   Recent Labs   Lab 09/16/19  1027   GSRESP <10 epithelial cells per low power field.  Rare WBC's  No organisms seen   RESPIRATORYC No Growth       Significant Imaging: I have reviewed all pertinent imaging results/findings within the past 24 hours.      Assessment/Plan:      *  Primary spontaneous pneumothorax  Cavitary lesion of lung    Patient presents with recurrent cough, shortness of breath and was found to have pneumothorax on CTA. CTA showed left apical cavitary lesion with peripheral nodular thickening measuring approximately 3.9 x 2.9 cm;  Additional left upper lobe spiculated pulmonary nodule measuring 1.3 cm; Right lower lobe ground-glass opacity; Right apical cavitary nodule measuring 1.5 cm;  Right upper lobe solid nodule measuring 0.9 cm. Upper lobe predominant centrilobular and paraseptal emphysema as well as bronchiectasis.  - currently afebrile without leukocytosis  - Pulmonology consulted, chest tube placed to suction  - bronchoscopy completed, biopsies, flow cytometry, cell count, and cultures pending  - respiratory culture no growth  - AFB, fungus in process  - airborne isolation precautions  - placed on 4L NC  - CXR with resolution of pneumothorax, chest tube placed in water  - repeat CXR pending   - likely home in 1-2 days pending removal of chest tube and follow up of cultures    Alcohol dependence  - serum EtOH 289 on presentation  - reports drinking daily 6 pack of beer since he was a child  - has gone days without drinking and not experienced withdrawal  - start scheduled ativan taper, see order  - CIWA      VTE Risk Mitigation (From admission, onward)        Ordered     Place ALFA hose  Until discontinued      09/14/19 1454     Place sequential compression device  Until discontinued      09/14/19 1454     IP VTE LOW RISK PATIENT  Once      09/14/19 1454                Sana Watkins PA-C  Department of Hospital Medicine   [facility]

## 2019-09-17 NOTE — SUBJECTIVE & OBJECTIVE
Interval History: Patient reports improvement in SOB and cough. VSS. Labs unremarkable. Chest tube in place. Blood culture NGTD. Repeat CXR pending. AFB, fungal culture in process. Will attempt to reach Dr. Perez in [place] to see why patient was treated with levaquin and doxy. Pulmonology following. Continue to monitor.     Review of Systems   Constitutional: Positive for chills, diaphoresis and unexpected weight change. Negative for fever.   Respiratory: Positive for cough (improving) and shortness of breath (improving). Negative for chest tightness and wheezing.    Cardiovascular: Positive for chest pain. Negative for palpitations.   Gastrointestinal: Negative for abdominal pain and nausea.   Genitourinary: Negative for difficulty urinating and dysuria.   Musculoskeletal: Negative for arthralgias and back pain.   Skin: Negative for color change and rash.   Neurological: Negative for weakness and light-headedness.   Psychiatric/Behavioral: Negative for agitation and confusion.     Objective:     Vital Signs (Most Recent):  Temp: 97.4 °F (36.3 °C) (09/16/19 2046)  Pulse: 62 (09/17/19 0729)  Resp: 12 (09/17/19 0729)  BP: 118/75 (09/17/19 0729)  SpO2: 99 % (09/17/19 0729) Vital Signs (24h Range):  Temp:  [97.4 °F (36.3 °C)] 97.4 °F (36.3 °C)  Pulse:  [60-76] 62  Resp:  [12-18] 12  SpO2:  [99 %-100 %] 99 %  BP: (118-133)/(75-81) 118/75     Weight: 48.4 kg (106 lb 11.2 oz)  Body mass index is 15.31 kg/m².    Intake/Output Summary (Last 24 hours) at 9/17/2019 1612  Last data filed at 9/17/2019 0729  Gross per 24 hour   Intake 200 ml   Output 0 ml   Net 200 ml      Physical Exam   Constitutional: He is oriented to person, place, and time. He appears well-nourished. He appears cachectic. No distress.   Cardiovascular: Normal rate and regular rhythm.   Pulmonary/Chest: Effort normal. No respiratory distress. He has decreased breath sounds (diffuse, improving). He has no wheezes.   Chest tube in place   Abdominal:  Soft. Bowel sounds are normal.   Musculoskeletal: He exhibits no edema or tenderness.   Neurological: He is alert and oriented to person, place, and time.   Skin: Skin is warm and dry. Capillary refill takes less than 2 seconds.   Psychiatric: He has a normal mood and affect. His behavior is normal.   Nursing note and vitals reviewed.      Significant Labs:   CBC:   Recent Labs   Lab 09/16/19  0706 09/17/19  0625   WBC 5.15 5.21   HGB 13.8* 14.7   HCT 43.1 46.8    186     CMP:   Recent Labs   Lab 09/16/19  0706 09/17/19  0625    136   K 4.2 4.0   CL 99 98   CO2 30* 32*   GLU 96 91   BUN 10 8   CREATININE 0.6 0.7   CALCIUM 9.6 9.9   ANIONGAP 7* 6*   EGFRNONAA >60.0 >60.0     Respiratory Culture:   Recent Labs   Lab 09/16/19  1027   GSRESP <10 epithelial cells per low power field.  Rare WBC's  No organisms seen   RESPIRATORYC No Growth       Significant Imaging: I have reviewed all pertinent imaging results/findings within the past 24 hours.

## 2019-09-18 LAB
ANION GAP SERPL CALC-SCNC: 12 MMOL/L (ref 8–16)
ANISOCYTOSIS BLD QL SMEAR: SLIGHT
BACTERIA SPEC AEROBE CULT: NO GROWTH
BASOPHILS # BLD AUTO: 0.04 K/UL (ref 0–0.2)
BASOPHILS NFR BLD: 0.8 % (ref 0–1.9)
BUN SERPL-MCNC: 8 MG/DL (ref 6–20)
CALCIUM SERPL-MCNC: 10 MG/DL (ref 8.7–10.5)
CHLORIDE SERPL-SCNC: 102 MMOL/L (ref 95–110)
CO2 SERPL-SCNC: 26 MMOL/L (ref 23–29)
CREAT SERPL-MCNC: 0.7 MG/DL (ref 0.5–1.4)
DIFFERENTIAL METHOD: ABNORMAL
EOSINOPHIL # BLD AUTO: 0.3 K/UL (ref 0–0.5)
EOSINOPHIL NFR BLD: 4.8 % (ref 0–8)
ERYTHROCYTE [DISTWIDTH] IN BLOOD BY AUTOMATED COUNT: 14.7 % (ref 11.5–14.5)
EST. GFR  (AFRICAN AMERICAN): >60 ML/MIN/1.73 M^2
EST. GFR  (NON AFRICAN AMERICAN): >60 ML/MIN/1.73 M^2
GALACTOMANNAN AG SPEC-ACNC: <0.5 INDEX
GLUCOSE SERPL-MCNC: 94 MG/DL (ref 70–110)
GRAM STN SPEC: NORMAL
HCT VFR BLD AUTO: 42.8 % (ref 40–54)
HGB BLD-MCNC: 14.3 G/DL (ref 14–18)
HYPOCHROMIA BLD QL SMEAR: ABNORMAL
IMM GRANULOCYTES # BLD AUTO: 0.02 K/UL (ref 0–0.04)
IMM GRANULOCYTES NFR BLD AUTO: 0.4 % (ref 0–0.5)
LYMPHOCYTES # BLD AUTO: 1.7 K/UL (ref 1–4.8)
LYMPHOCYTES NFR BLD: 33.1 % (ref 18–48)
M TB IFN-G CD4+ BCKGRND COR BLD-ACNC: 0.01 IU/ML
MCH RBC QN AUTO: 33.5 PG (ref 27–31)
MCHC RBC AUTO-ENTMCNC: 33.4 G/DL (ref 32–36)
MCV RBC AUTO: 100 FL (ref 82–98)
MITOGEN IGNF BCKGRD COR BLD-ACNC: >10 IU/ML
MITOGEN IGNF BCKGRD COR BLD-ACNC: NEGATIVE [IU]/ML
MONOCYTES # BLD AUTO: 0.7 K/UL (ref 0.3–1)
MONOCYTES NFR BLD: 13.7 % (ref 4–15)
NEUTROPHILS # BLD AUTO: 2.5 K/UL (ref 1.8–7.7)
NEUTROPHILS NFR BLD: 47.2 % (ref 38–73)
NIL: 0.02 IU/ML
NRBC BLD-RTO: 0 /100 WBC
OVALOCYTES BLD QL SMEAR: ABNORMAL
PLATELET # BLD AUTO: 175 K/UL (ref 150–350)
PMV BLD AUTO: 11.6 FL (ref 9.2–12.9)
POIKILOCYTOSIS BLD QL SMEAR: SLIGHT
POLYCHROMASIA BLD QL SMEAR: ABNORMAL
POTASSIUM SERPL-SCNC: 4.5 MMOL/L (ref 3.5–5.1)
RBC # BLD AUTO: 4.27 M/UL (ref 4.6–6.2)
SODIUM SERPL-SCNC: 140 MMOL/L (ref 136–145)
TB2 - NIL: 0.01 IU/ML
WBC # BLD AUTO: 5.2 K/UL (ref 3.9–12.7)

## 2019-09-18 PROCEDURE — 99232 SBSQ HOSP IP/OBS MODERATE 35: CPT | Mod: ,,, | Performed by: INTERNAL MEDICINE

## 2019-09-18 PROCEDURE — 36415 COLL VENOUS BLD VENIPUNCTURE: CPT

## 2019-09-18 PROCEDURE — 80048 BASIC METABOLIC PNL TOTAL CA: CPT

## 2019-09-18 PROCEDURE — 11000001 HC ACUTE MED/SURG PRIVATE ROOM

## 2019-09-18 PROCEDURE — 86403 PARTICLE AGGLUT ANTBDY SCRN: CPT

## 2019-09-18 PROCEDURE — 99233 PR SUBSEQUENT HOSPITAL CARE,LEVL III: ICD-10-PCS | Mod: ,,, | Performed by: PHYSICIAN ASSISTANT

## 2019-09-18 PROCEDURE — 99233 SBSQ HOSP IP/OBS HIGH 50: CPT | Mod: ,,, | Performed by: PHYSICIAN ASSISTANT

## 2019-09-18 PROCEDURE — 86635 COCCIDIOIDES ANTIBODY: CPT

## 2019-09-18 PROCEDURE — 85025 COMPLETE CBC W/AUTO DIFF WBC: CPT

## 2019-09-18 PROCEDURE — 99232 PR SUBSEQUENT HOSPITAL CARE,LEVL II: ICD-10-PCS | Mod: ,,, | Performed by: INTERNAL MEDICINE

## 2019-09-18 PROCEDURE — 25000003 PHARM REV CODE 250: Performed by: PHYSICIAN ASSISTANT

## 2019-09-18 RX ADMIN — GUAIFENESIN AND DEXTROMETHORPHAN HYDROBROMIDE 1 TABLET: 600; 30 TABLET, EXTENDED RELEASE ORAL at 09:09

## 2019-09-18 RX ADMIN — THERA TABS 1 TABLET: TAB at 09:09

## 2019-09-18 RX ADMIN — FOLIC ACID 1 MG: 1 TABLET ORAL at 09:09

## 2019-09-18 RX ADMIN — LORAZEPAM 1 MG: 0.5 TABLET ORAL at 06:09

## 2019-09-18 NOTE — PROGRESS NOTES
"[facility] Hospital Medicine  Progress Note    Patient Name: Ki Chacon  MRN: [id number]  Patient Class: IP- Inpatient   Admission Date: 9/14/2019  Length of Stay: 4 days  Attending Physician: Brianda Lacey MD  Primary Care Provider: Primary Doctor [facility] Medicine Team: [facility] MED F Sana Watkins PA-C    Subjective:     Principal Problem:Primary spontaneous pneumothorax        HPI:  Patient is a 52yo male with a PMHx of emphysema being admitted to medicine for spontaneous pneumothorax. Patient reports onset of chest pain and shortness of breath over the last several days. He first noticed his cough over the last year and it has progressively worsened with dyspnea on exertion. He has found it more difficult to ambulate or do any of his ADLs. He reports associated cough with sputum, fevers, nightsweats, and 20 pound weight loss in the last several months. He reports traveling as an  for work, but was recently in [place] in July for work and was admitted to the hospital for workup of hemoptysis and shortness of breath. At that time, he was found to have a cavitary lesion in his left lung. He reports an extensive workup with sputum samples that was negative for TB, but was diagnosed with a "bacteria that is not found in the [place]". He advised to contact his doctor at previous hospital for further information. He returned from [place] and has been unable to help care for his wife who was hospitalized for a hip replacement due to his illness. He reports smoking 1 pack per day for 35 years. He denies travel outside of the [place].    Of note, patient has a history of recurrent pneumothorax since he broke two ribs in 1995. He had another traumatic event in 2005 that resulted in another pneumothorax. Since that time, he has had several "crackles" in his lung that he has managed on his own.     In the ED, vitals stable on 4L O2. Intake labs remarkable for alcohol 289. CTA chest " showed left apical cavitary lesion with peripheral nodular thickening measuring approximately 3.9 x 2.9 cm. Additional left upper lobe spiculated pulmonary nodule measuring 1.3 cm, right lower lobe ground-glass opacity; right apical cavitary nodule measuring 1.5 cm; Right upper lobe solid nodule measuring 0.9 cm; Upper lobe predominant centrilobular and paraseptal emphysema as well as bronchiectasis. Pulmonology consulted and placed a chest tube for pneumothorax treatment, cultures obtained.    Overview/Hospital Course:  Patient admitted to medicine for spontaneous pneumothorax. Pulmonology consulted, chest tube placed. Bronchoscopy completed with lavage of MAINOR cavitary lesion. Prelim fungal culture positive for yeast. Bacterial respiratory culture with NGTD. AFB/ Quantiferon gold pending.   He has no insurance, so will need follow up with [facility]. His [place] Medicaid is pending.    Interval History: Patient without complaints except anxious to leave room as he has been on isolation precautions. Chest tube pulled this am without difficulty. Respiratory fungal culture growing candida albicans. ID consulted. Isolation precautions d/c. Crypto and blasto eval added. No clear indication for extended treatment with Doxy, per Dr. Perez in [place], patient was discharged (7/19) with Levaquin for pseudomonas PNA. AFB/ fungal culture negative at that time. Will continue to monitor.     Review of Systems   Constitutional: Positive for unexpected weight change. Negative for chills and fever.   HENT: Negative for congestion and rhinorrhea.    Respiratory: Positive for cough (improving). Negative for shortness of breath.    Cardiovascular: Negative for chest pain and palpitations.   Gastrointestinal: Negative for abdominal distention, abdominal pain, diarrhea, nausea and vomiting.   Genitourinary: Negative for dysuria and hematuria.   Musculoskeletal: Negative for arthralgias and back pain.   Neurological: Negative  for dizziness, weakness, light-headedness and headaches.   Psychiatric/Behavioral: Negative for agitation, behavioral problems, confusion and decreased concentration.     Objective:     Vital Signs (Most Recent):  Temp: 97.8 °F (36.6 °C) (09/18/19 0718)  Pulse: 68 (09/18/19 0718)  Resp: 14 (09/18/19 0718)  BP: 114/79 (09/18/19 0718)  SpO2: 97 % (09/18/19 0718) Vital Signs (24h Range):  Temp:  [97.5 °F (36.4 °C)-98.2 °F (36.8 °C)] 97.8 °F (36.6 °C)  Pulse:  [68-74] 68  Resp:  [12-16] 14  SpO2:  [97 %-100 %] 97 %  BP: (113-127)/(78-88) 114/79     Weight: 48.4 kg (106 lb 11.2 oz)  Body mass index is 15.31 kg/m².    Intake/Output Summary (Last 24 hours) at 9/18/2019 1544  Last data filed at 9/18/2019 0718  Gross per 24 hour   Intake 480 ml   Output 200 ml   Net 280 ml      Physical Exam   Constitutional: He is oriented to person, place, and time. He appears well-developed and well-nourished.   Pleasant cachectic male   HENT:   Head: Normocephalic and atraumatic.   Eyes: Pupils are equal, round, and reactive to light. EOM are normal.   Neck: Normal range of motion. Neck supple.   Cardiovascular: Normal rate, regular rhythm, normal heart sounds and intact distal pulses.   Pulmonary/Chest: Effort normal and breath sounds normal.   Abdominal: Soft. Bowel sounds are normal.   Musculoskeletal: Normal range of motion. He exhibits no edema or tenderness.   Neurological: He is alert and oriented to person, place, and time.   Skin: Skin is warm and dry. Capillary refill takes less than 2 seconds.   Psychiatric: He has a normal mood and affect. His behavior is normal. Judgment and thought content normal.   Nursing note and vitals reviewed.      Significant Labs:   CBC:   Recent Labs   Lab 09/17/19  0625 09/18/19  0546   WBC 5.21 5.20   HGB 14.7 14.3   HCT 46.8 42.8    175     CMP:   Recent Labs   Lab 09/17/19  0625 09/18/19  0546    140   K 4.0 4.5   CL 98 102   CO2 32* 26   GLU 91 94   BUN 8 8   CREATININE 0.7 0.7    CALCIUM 9.9 10.0   ANIONGAP 6* 12   EGFRNONAA >60.0 >60.0     Magnesium: No results for input(s): MG in the last 48 hours.  POCT Glucose: No results for input(s): POCTGLUCOSE in the last 48 hours.  Respiratory Culture: No results for input(s): GSRESP, RESPIRATORYC in the last 48 hours.    Significant Imaging: I have reviewed all pertinent imaging results/findings within the past 24 hours.      Assessment/Plan:      * Primary spontaneous pneumothorax  Cavitary lesion of lung    Patient presents with recurrent cough, shortness of breath and was found to have pneumothorax on CTA. CTA showed left apical cavitary lesion with peripheral nodular thickening measuring approximately 3.9 x 2.9 cm;  Additional left upper lobe spiculated pulmonary nodule measuring 1.3 cm; Right lower lobe ground-glass opacity; Right apical cavitary nodule measuring 1.5 cm;  Right upper lobe solid nodule measuring 0.9 cm. Upper lobe predominant centrilobular and paraseptal emphysema as well as bronchiectasis.  - currently afebrile without leukocytosis  - Pulmonology consulted, chest tube placed to suction  - bronchoscopy completed, biopsies, flow cytometry, cell count, and cultures pending  - respiratory culture no growth   - AFB, quantiferon gold in process  - Fungal culture with candida albicans  - ID consulted, appreciate recs  - blasto and crypto pending  - CXR with resolution of pneumothorax, chest tube removed 9/18 without difficulty  - repeat CXR without pneumothorax  - likely home in 1-2 days pending removal of chest tube and follow up of cultures    Alcohol dependence  - serum EtOH 289 on presentation  - reports drinking daily 6 pack of beer since he was a child  - has gone days without drinking and not experienced withdrawal  - start scheduled ativan taper, see order  - CIWA      VTE Risk Mitigation (From admission, onward)        Ordered     Place ALFA hose  Until discontinued      09/14/19 1454     Place sequential compression device   Until discontinued      09/14/19 1454     IP VTE LOW RISK PATIENT  Once      09/14/19 1454                Sana Watkins PA-C  Department of Hospital Medicine   [facility]

## 2019-09-18 NOTE — PHYSICIAN QUERY
PT Name: Ki Chacon  MR #: [id number]    Physician Query Form - Nutrition Clarification     CDS/: Bee Green               Contact information: [email or web address]    This form is a permanent document in the medical record.     Query Date: September 18, 2019    By submitting this query, we are merely seeking further clarification of documentation.. Please utilize your independent clinical judgment when addressing the question(s) below.    The Medical record contains the following:   Indicators  Supporting Clinical Findings Location in Medical Record    % of Estimated Energy Intake over a time frame from p.o., TF, or TPN     x Weight Status over a time frame 20 pound weight loss in the last several months.   H&P 9/14    Subcutaneous Fat and/or Muscle Loss      Fluid Accumulation or Edema      Reduced  Strength     x Wt / BMI / Usual Body Weight Weight: 48.4 kg (106 lb 11.2 oz)   Body mass index is 15.31 kg/m².      H&P 9/14    Delayed Wound Healing / Failure to Thrive     x Acute or Chronic Illness Primary spontaneous pneumothorax     Cavitary lesion of lung     Pt has emphysema/copd     H&P 9/14        Pulm CN 9/14    Medication      Treatment     x Other Patient thin appearing     He appears cachectic      lack of appetite   Pulm PN 9/17    H&P 9/14 - HM PN 9/17      Pulm CN 9/14     AND / ASPEN Clinical Characteristics (October 2011)  A minimum of two characteristics is recommended for diagnosing either moderate or severe malnutrition   Mild Malnutrition Moderate Malnutrition Severe Malnutrition   Energy Intake from p.o., TF or TPN. < 75% intake of estimated energy needs for less than 7 days < 75% intake of estimated energy needs for greater than 7 days < 50% intake of estimated energy needs for > 5 days   Weight Loss 1-2% in 1 month  5% in 3 months  7.5% in 6 months  10% in 1 year 1-2 % in 1 week  5% in 1 month  7.5% in 3 months  10% in 6 months  20% in 1 year > 2% in 1 week  > 5% in 1  month  > 7.5% in 3 months  > 10% in 6 months  > 20% in 1 year   Physical Findings     None *Mild subcutaneous fat and/or muscle loss  *Mild fluid accumulation  *Stage II decubitus  *Surgical wound or non-healing wound *Mod/severe subcutaneous fat and/or muscle loss  *Mod/severe fluid accumulation  *Stage III or IV decubitus  *Non-healing surgical wound     Provider, please specify diagnosis or diagnoses associated with above clinical findings.    [ X ] Mild Protein-Calorie Malnutrition   [  ] Cachexia   [  ] Underweight   [  ] Other Nutritional Diagnosis (please specify):    [  ] Other:    [  ] Clinically Undetermined       Please document in your progress notes daily for the duration of treatment until resolved and include in your discharge summary.

## 2019-09-18 NOTE — SUBJECTIVE & OBJECTIVE
Interval History: Patient without complaints except anxious to leave room as he has been on isolation precautions. Chest tube pulled this am without difficulty. Respiratory fungal culture growing candida albicans. ID consulted. Isolation precautions d/c. Crypto and blasto eval added. No clear indication for extended treatment with Doxy, per Dr. Perez in [place], patient was discharged (7/19) with Levaquin for pseudomonas PNA. AFB/ fungal culture negative at that time. Will continue to monitor.     Review of Systems   Constitutional: Positive for unexpected weight change. Negative for chills and fever.   HENT: Negative for congestion and rhinorrhea.    Respiratory: Positive for cough (improving). Negative for shortness of breath.    Cardiovascular: Negative for chest pain and palpitations.   Gastrointestinal: Negative for abdominal distention, abdominal pain, diarrhea, nausea and vomiting.   Genitourinary: Negative for dysuria and hematuria.   Musculoskeletal: Negative for arthralgias and back pain.   Neurological: Negative for dizziness, weakness, light-headedness and headaches.   Psychiatric/Behavioral: Negative for agitation, behavioral problems, confusion and decreased concentration.     Objective:     Vital Signs (Most Recent):  Temp: 97.8 °F (36.6 °C) (09/18/19 0718)  Pulse: 68 (09/18/19 0718)  Resp: 14 (09/18/19 0718)  BP: 114/79 (09/18/19 0718)  SpO2: 97 % (09/18/19 0718) Vital Signs (24h Range):  Temp:  [97.5 °F (36.4 °C)-98.2 °F (36.8 °C)] 97.8 °F (36.6 °C)  Pulse:  [68-74] 68  Resp:  [12-16] 14  SpO2:  [97 %-100 %] 97 %  BP: (113-127)/(78-88) 114/79     Weight: 48.4 kg (106 lb 11.2 oz)  Body mass index is 15.31 kg/m².    Intake/Output Summary (Last 24 hours) at 9/18/2019 1544  Last data filed at 9/18/2019 0718  Gross per 24 hour   Intake 480 ml   Output 200 ml   Net 280 ml      Physical Exam   Constitutional: He is oriented to person, place, and time. He appears well-developed and well-nourished.   Pleasant  cachectic male   HENT:   Head: Normocephalic and atraumatic.   Eyes: Pupils are equal, round, and reactive to light. EOM are normal.   Neck: Normal range of motion. Neck supple.   Cardiovascular: Normal rate, regular rhythm, normal heart sounds and intact distal pulses.   Pulmonary/Chest: Effort normal and breath sounds normal.   Abdominal: Soft. Bowel sounds are normal.   Musculoskeletal: Normal range of motion. He exhibits no edema or tenderness.   Neurological: He is alert and oriented to person, place, and time.   Skin: Skin is warm and dry. Capillary refill takes less than 2 seconds.   Psychiatric: He has a normal mood and affect. His behavior is normal. Judgment and thought content normal.   Nursing note and vitals reviewed.      Significant Labs:   CBC:   Recent Labs   Lab 09/17/19  0625 09/18/19  0546   WBC 5.21 5.20   HGB 14.7 14.3   HCT 46.8 42.8    175     CMP:   Recent Labs   Lab 09/17/19 0625 09/18/19  0546    140   K 4.0 4.5   CL 98 102   CO2 32* 26   GLU 91 94   BUN 8 8   CREATININE 0.7 0.7   CALCIUM 9.9 10.0   ANIONGAP 6* 12   EGFRNONAA >60.0 >60.0     Magnesium: No results for input(s): MG in the last 48 hours.  POCT Glucose: No results for input(s): POCTGLUCOSE in the last 48 hours.  Respiratory Culture: No results for input(s): GSRESP, RESPIRATORYC in the last 48 hours.    Significant Imaging: I have reviewed all pertinent imaging results/findings within the past 24 hours.

## 2019-09-18 NOTE — CONSULTS
[facility]  Infectious Disease  Consult Note    Patient Name: Ki Chacon  MRN: [id number]  Admission Date: 9/14/2019  Hospital Length of Stay: 4 days  Attending Physician: Brianda Lacey MD  Primary Care Provider: Primary Doctor No     Isolation Status: No active isolations    Patient information was obtained from patient, spouse/SO, past medical records, records from [facility] and ER records.      Consults  Assessment/Plan:     Cavitary lesion of lung  Mr. Chacon is a 54yo with PMH of emphysema, hx of smoking (35+ pack years), anxiety, and history of multiple pneumothoraces who initially presented with L sided chest pain found to have a L sided moderate pneumothorax. ID consulted for management of sputum cultures with yeast in setting of cavitary lesions. S/p Chest tube 9/14 with removal 09/18/2019 and resolution of PTX. S/p Bronch with BAL-     History of pseudomonas PNA 2 months ago. 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 He had 3 AFBs which were negative. He never had bronchoscopy at that hospital. HIV was negative. He was treated for pseudomonas PNA with IV cipro and sent home on levaquin. Patients wife reports he was supposed to be on doxycycline long term for a rare bacteria. I have called the hospitalist Dr. Vanessa Perez at [facility] who undertook care of Mr. Chacon's in 7/2019. He stated there were concerns for necrotizing PNA due to imaging findings but workup was only notable for Pseudomonas PNA. Concern for Valley Fever at the time in [place] but Coccidioides & fungal cx were negative and he was discharged on oral levaquin. No final recommendations for doxycycline were made.     CT Chest: Lungs/Pleura: Moderate-sized left pneumothorax.  Dependent left lung atelectasis.  Left apical cavitary lesion with peripheral nodular thickening measuring approximately 3.9 x 2.9 cm (image 22, series 3).     Additional left upper lobe spiculated pulmonary nodule measuring 1.3 cm (image 33, series 3).  Right lower lobe ground-glass opacity.  Right apical cavitary nodule measuring 1.5 cm (image 20, series 3).  Right upper lobe solid nodule measuring 0.9 cm (image 30, series 3).  Upper lobe predominant centrilobular and paraseptal emphysema as well as bronchiectasis.  No pleural effusion.    Sputum Culture: Few Yeast, normal respiratory zion, negative AFB stain  Quant Gold Negative    Assessment: CT chest with notable bullae & blebs given patient's longstanding smoking history & subsequent emphysema. Few yeast most likely to be candida which is found in oral zion and not likely to cause acute infectious process of his lungs. Patient with significant wasting on physical exam (though wife states this is chronic) would warrant further evaluation for malignancy or nontuberculous mycobacteria.     Recommendation:   -Ok to take off airborne precautions given AFB stain negative  -No antifungal needed at this time for the yeast. Await speciation. Would ensure AFB Cx negative for NTM  -Send additional serologies for blastomycosis & cryptococcus   -Await BAL pathology results   -Patient with significant wasting, would consider rechecking HIV     Thank you for your consult. I will follow-up with patient. Please contact us if you have any additional questions.    Brigitte Elaine MD  Infectious Disease  [facility]    Subjective:     Principal Problem: Primary spontaneous pneumothorax    HPI: Mr. Chacon is a 52yo with PMH of emphysema, hx of smoking (35+ pack years), anxiety, and history of multiple pneumothoraces who initially presented with L sided chest pain found to have a L sided moderate pneumothorax. ID consulted for management of bronchoscopy cultures with yeast in setting of cavitary lesions.     Per patient & wife at bed side he was admitted to [place], [place] in 07/2019 while working  in [place]. He has brought all the paperwork and at that time, he was admitted for pneumonia with sputum's growing pseudomonas. At that time, he had CT chest that showed left sided necrotizing pneumonia with cavitary lung lesion. He had 3 AFBs which were negative. He never had bronchoscopy at that hospital. HIV was negative. He was treated for pseudomonas PNA with IV cipro and sent home on levaquin. Patient denies any h/o TB, no family h/o TB and no known contacts with TB. He was born in [place] but currently lives in [place]. He hasn't been able to see a pulmonologist due to taking care of his wife. He says that in the past  week, he's been having night sweats, lack of appetite. He has had hemoptysis a few weeks ago and none recently. No fevers or purulent sputum recently either.  Per wife, he has always been petite and had a lower BMI and his mother is similar. Denies history of IV drug use.  He is a chronic smoker of about 1-2 ppd for 35 years. Denies any travel outside the country though he has been through multiple airports.     Past Medical History:   Diagnosis Date    Ankylosis     Emphysema of lung     Pneumonia     Pneumothorax        Past Surgical History:   Procedure Laterality Date    BLADDER DIVERTICULECTOMY      CHEST TUBE INSERTION Left        Review of patient's allergies indicates:  No Known Allergies    Medications:  No medications prior to admission.     Antibiotics (From admission, onward)    None        Antifungals (From admission, onward)    None        Antivirals (From admission, onward)    None             There is no immunization history on file for this patient.    Family History     None        Social History     Socioeconomic History    Marital status:      Spouse name: Not on file    Number of children: Not on file    Years of education: Not on file    Highest education level: Not on file   Occupational History    Not on file   Social Needs    Financial  resource strain: Not on file    Food insecurity:     Worry: Not on file     Inability: Not on file    Transportation needs:     Medical: Not on file     Non-medical: Not on file   Tobacco Use    Smoking status: Current Every Day Smoker     Packs/day: 1.00     Types: Cigarettes    Smokeless tobacco: Never Used   Substance and Sexual Activity    Alcohol use: Yes     Comment: daily, 6 pack, last drink 5am today    Drug use: Never    Sexual activity: Not on file   Lifestyle    Physical activity:     Days per week: Not on file     Minutes per session: Not on file    Stress: Not on file   Relationships    Social connections:     Talks on phone: Not on file     Gets together: Not on file     Attends Alevism service: Not on file     Active member of club or organization: Not on file     Attends meetings of clubs or organizations: Not on file     Relationship status: Not on file   Other Topics Concern    Not on file   Social History Narrative    Not on file     Review of Systems   Constitutional: Negative for chills, fatigue, fever and unexpected weight change.   HENT: Negative for congestion, postnasal drip and rhinorrhea.    Respiratory: Negative for cough, chest tightness, shortness of breath and wheezing.    Cardiovascular: Negative for chest pain, palpitations and leg swelling.   Gastrointestinal: Negative for abdominal distention, abdominal pain, constipation, diarrhea, nausea and vomiting.   Endocrine: Negative for polydipsia, polyphagia and polyuria.   Genitourinary: Negative for decreased urine volume, difficulty urinating, dysuria, flank pain, frequency and hematuria.   Musculoskeletal: Negative for arthralgias and back pain.   Skin: Negative for color change, pallor and wound.   Neurological: Negative for dizziness, tremors, facial asymmetry, speech difficulty, weakness, light-headedness, numbness and headaches.   Hematological: Negative for adenopathy. Does not bruise/bleed easily.    Psychiatric/Behavioral: Negative for agitation, behavioral problems, confusion and decreased concentration.     Objective:     Vital Signs (Most Recent):  Temp: 98 °F (36.7 °C) (09/18/19 0705)  Pulse: 68 (09/18/19 0705)  Resp: 12 (09/18/19 0705)  BP: 114/79 (09/18/19 0705)  SpO2: 98 % (09/18/19 0705) Vital Signs (24h Range):  Temp:  [97.5 °F (36.4 °C)-98.2 °F (36.8 °C)] 98 °F (36.7 °C)  Pulse:  [68-77] 68  Resp:  [12-18] 12  SpO2:  [97 %-100 %] 98 %  BP: (105-127)/(69-88) 114/79     Weight: 48.4 kg (106 lb 11.2 oz)  Body mass index is 15.31 kg/m².    Estimated Creatinine Clearance: 85.5 mL/min (based on SCr of 0.7 mg/dL).    Physical Exam   Constitutional: He is oriented to person, place, and time. He appears well-developed and well-nourished.   HENT:   Head: Normocephalic and atraumatic.   Eyes: Pupils are equal, round, and reactive to light. EOM are normal.   Neck: Normal range of motion. Neck supple. No JVD present.   Cardiovascular: Normal rate, regular rhythm, normal heart sounds and intact distal pulses. Exam reveals no gallop and no friction rub.   No murmur heard.  Pulmonary/Chest: Effort normal and breath sounds normal. No stridor. No respiratory distress. He has no wheezes.   Abdominal: Soft. Bowel sounds are normal. He exhibits no distension. There is no tenderness. There is no guarding.   Musculoskeletal: Normal range of motion.   Neurological: He is alert and oriented to person, place, and time. No cranial nerve deficit or sensory deficit. Coordination normal.   Skin: Skin is warm and dry. No rash noted.   Psychiatric: He has a normal mood and affect. His behavior is normal. Judgment and thought content normal.       Significant Labs:   Blood Culture:   Recent Labs   Lab 09/14/19  0838   LABBLOO No Growth to date  No Growth to date  No Growth to date  No Growth to date  No Growth to date  No Growth to date  No Growth to date  No Growth to date  No Growth to date  No Growth to date     CBC:    Recent Labs   Lab 09/17/19  0625 09/18/19  0546   WBC 5.21 5.20   HGB 14.7 14.3   HCT 46.8 42.8    175     CMP:   Recent Labs   Lab 09/17/19  0625 09/18/19  0546    140   K 4.0 4.5   CL 98 102   CO2 32* 26   GLU 91 94   BUN 8 8   CREATININE 0.7 0.7   CALCIUM 9.9 10.0   ANIONGAP 6* 12   EGFRNONAA >60.0 >60.0     Coagulation: No results for input(s): PT, INR, APTT in the last 48 hours.  Fungus Culture (Blood or Bone Marrow): No results for input(s): FUNGUSCULTUR in the last 4320 hours.  HIV 1/2 Antibodies: No results for input(s): [id number] in the last 48 hours.    Significant Imaging: I have reviewed all pertinent imaging results/findings within the past 24 hours.

## 2019-09-18 NOTE — CONSULTS
Consult received. Full note to Follow. Please call with any questions or concerns.    Tigre Archer  Infectious Disease Fellow  [facility] [id number]  Pager [phone]

## 2019-09-18 NOTE — ASSESSMENT & PLAN NOTE
Cavitary lesion of lung    Patient presents with recurrent cough, shortness of breath and was found to have pneumothorax on CTA. CTA showed left apical cavitary lesion with peripheral nodular thickening measuring approximately 3.9 x 2.9 cm;  Additional left upper lobe spiculated pulmonary nodule measuring 1.3 cm; Right lower lobe ground-glass opacity; Right apical cavitary nodule measuring 1.5 cm;  Right upper lobe solid nodule measuring 0.9 cm. Upper lobe predominant centrilobular and paraseptal emphysema as well as bronchiectasis.  - currently afebrile without leukocytosis  - Pulmonology consulted, chest tube placed to suction  - bronchoscopy completed, biopsies, flow cytometry, cell count, and cultures pending  - respiratory culture no growth   - AFB, quantiferon gold in process  - Fungal culture with candida albicans  - ID consulted, appreciate recs  - blasto and crypto pending  - CXR with resolution of pneumothorax, chest tube removed 9/18 without difficulty  - repeat CXR without pneumothorax  - likely home in 1-2 days pending removal of chest tube and follow up of cultures

## 2019-09-18 NOTE — ASSESSMENT & PLAN NOTE
Mr. Chacon is a 52yo with PMH of emphysema, hx of smoking (35+ pack years), anxiety, and history of multiple pneumothoraces who initially presented with L sided chest pain found to have a L sided moderate pneumothorax. ID consulted for management of sputum cultures with yeast in setting of cavitary lesions. S/p Chest tube 9/14 with removal 09/18/2019 and resolution of PTX. S/p Bronch with BAL-     History of pseudomonas PNA 2 months ago. 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 He had 3 AFBs which were negative. He never had bronchoscopy at that hospital. HIV was negative. He was treated for pseudomonas PNA with IV cipro and sent home on levaquin. Patients wife reports he was supposed to be on doxycycline long term for a rare bacteria. I have called the hospitalist Dr. Vanessa Perez at [facility] who undertook care of Mr. Chacon's in 7/2019. He stated there were concerns for necrotizing PNA due to imaging findings but workup was only notable for Pseudomonas PNA. Concern for Valley Fever at the time in [place] but Coccidioides & fungal cx were negative and he was discharged on oral levaquin. No final recommendations for doxycycline were made.     CT Chest: Lungs/Pleura: Moderate-sized left pneumothorax.  Dependent left lung atelectasis.  Left apical cavitary lesion with peripheral nodular thickening measuring approximately 3.9 x 2.9 cm (image 22, series 3).    Additional left upper lobe spiculated pulmonary nodule measuring 1.3 cm (image 33, series 3).  Right lower lobe ground-glass opacity.  Right apical cavitary nodule measuring 1.5 cm (image 20, series 3).  Right upper lobe solid nodule measuring 0.9 cm (image 30, series 3).  Upper lobe predominant centrilobular and paraseptal emphysema as well as bronchiectasis.  No pleural effusion.    Sputum Culture: Few Yeast, normal respiratory zion, negative AFB stain  Quant Gold Negative    Assessment:  CT chest with notable bullae & blebs given patient's longstanding smoking history & subsequent emphysema. Few yeast most likely to be candida which is found in oral zion and not likely to cause acute infectious process of his lungs. Patient with significant wasting on physical exam (though wife states this is chronic) would warrant further evaluation for malignancy or nontuberculous mycobacteria.     Recommendation:   -Ok to take off airborne precautions given AFB stain negative  -No antifungal needed at this time for the yeast. Await speciation. Would ensure AFB Cx negative for NTM  -Send additional serologies for blastomycosis & cryptococcus   -Await BAL pathology results   -Patient with significant wasting, would consider rechecking HIV

## 2019-09-18 NOTE — SUBJECTIVE & OBJECTIVE
Past Medical History:   Diagnosis Date    Ankylosis     Emphysema of lung     Pneumonia     Pneumothorax        Past Surgical History:   Procedure Laterality Date    BLADDER DIVERTICULECTOMY      CHEST TUBE INSERTION Left        Review of patient's allergies indicates:  No Known Allergies    Medications:  No medications prior to admission.     Antibiotics (From admission, onward)    None        Antifungals (From admission, onward)    None        Antivirals (From admission, onward)    None             There is no immunization history on file for this patient.    Family History     None        Social History     Socioeconomic History    Marital status:      Spouse name: Not on file    Number of children: Not on file    Years of education: Not on file    Highest education level: Not on file   Occupational History    Not on file   Social Needs    Financial resource strain: Not on file    Food insecurity:     Worry: Not on file     Inability: Not on file    Transportation needs:     Medical: Not on file     Non-medical: Not on file   Tobacco Use    Smoking status: Current Every Day Smoker     Packs/day: 1.00     Types: Cigarettes    Smokeless tobacco: Never Used   Substance and Sexual Activity    Alcohol use: Yes     Comment: daily, 6 pack, last drink 5am today    Drug use: Never    Sexual activity: Not on file   Lifestyle    Physical activity:     Days per week: Not on file     Minutes per session: Not on file    Stress: Not on file   Relationships    Social connections:     Talks on phone: Not on file     Gets together: Not on file     Attends Synagogue service: Not on file     Active member of club or organization: Not on file     Attends meetings of clubs or organizations: Not on file     Relationship status: Not on file   Other Topics Concern    Not on file   Social History Narrative    Not on file     Review of Systems   Constitutional: Negative for chills, fatigue, fever and  unexpected weight change.   HENT: Negative for congestion, postnasal drip and rhinorrhea.    Respiratory: Negative for cough, chest tightness, shortness of breath and wheezing.    Cardiovascular: Negative for chest pain, palpitations and leg swelling.   Gastrointestinal: Negative for abdominal distention, abdominal pain, constipation, diarrhea, nausea and vomiting.   Endocrine: Negative for polydipsia, polyphagia and polyuria.   Genitourinary: Negative for decreased urine volume, difficulty urinating, dysuria, flank pain, frequency and hematuria.   Musculoskeletal: Negative for arthralgias and back pain.   Skin: Negative for color change, pallor and wound.   Neurological: Negative for dizziness, tremors, facial asymmetry, speech difficulty, weakness, light-headedness, numbness and headaches.   Hematological: Negative for adenopathy. Does not bruise/bleed easily.   Psychiatric/Behavioral: Negative for agitation, behavioral problems, confusion and decreased concentration.     Objective:     Vital Signs (Most Recent):  Temp: 98 °F (36.7 °C) (09/18/19 0705)  Pulse: 68 (09/18/19 0705)  Resp: 12 (09/18/19 0705)  BP: 114/79 (09/18/19 0705)  SpO2: 98 % (09/18/19 0705) Vital Signs (24h Range):  Temp:  [97.5 °F (36.4 °C)-98.2 °F (36.8 °C)] 98 °F (36.7 °C)  Pulse:  [68-77] 68  Resp:  [12-18] 12  SpO2:  [97 %-100 %] 98 %  BP: (105-127)/(69-88) 114/79     Weight: 48.4 kg (106 lb 11.2 oz)  Body mass index is 15.31 kg/m².    Estimated Creatinine Clearance: 85.5 mL/min (based on SCr of 0.7 mg/dL).    Physical Exam   Constitutional: He is oriented to person, place, and time. He appears well-developed and well-nourished.   HENT:   Head: Normocephalic and atraumatic.   Eyes: Pupils are equal, round, and reactive to light. EOM are normal.   Neck: Normal range of motion. Neck supple. No JVD present.   Cardiovascular: Normal rate, regular rhythm, normal heart sounds and intact distal pulses. Exam reveals no gallop and no friction rub.    No murmur heard.  Pulmonary/Chest: Effort normal and breath sounds normal. No stridor. No respiratory distress. He has no wheezes.   Abdominal: Soft. Bowel sounds are normal. He exhibits no distension. There is no tenderness. There is no guarding.   Musculoskeletal: Normal range of motion.   Neurological: He is alert and oriented to person, place, and time. No cranial nerve deficit or sensory deficit. Coordination normal.   Skin: Skin is warm and dry. No rash noted.   Psychiatric: He has a normal mood and affect. His behavior is normal. Judgment and thought content normal.       Significant Labs:   Blood Culture:   Recent Labs   Lab 09/14/19  0838   LABBLOO No Growth to date  No Growth to date  No Growth to date  No Growth to date  No Growth to date  No Growth to date  No Growth to date  No Growth to date  No Growth to date  No Growth to date     CBC:   Recent Labs   Lab 09/17/19  0625 09/18/19  0546   WBC 5.21 5.20   HGB 14.7 14.3   HCT 46.8 42.8    175     CMP:   Recent Labs   Lab 09/17/19  0625 09/18/19  0546    140   K 4.0 4.5   CL 98 102   CO2 32* 26   GLU 91 94   BUN 8 8   CREATININE 0.7 0.7   CALCIUM 9.9 10.0   ANIONGAP 6* 12   EGFRNONAA >60.0 >60.0     Coagulation: No results for input(s): PT, INR, APTT in the last 48 hours.  Fungus Culture (Blood or Bone Marrow): No results for input(s): FUNGUSCULTUR in the last 4320 hours.  HIV 1/2 Antibodies: No results for input(s): [id number] in the last 48 hours.    Significant Imaging: I have reviewed all pertinent imaging results/findings within the past 24 hours.

## 2019-09-18 NOTE — HPI
Mr. Chacon is a 54yo with PMH of emphysema, hx of smoking (35+ pack years), anxiety, and history of multiple pneumothoraces who initially presented with L sided chest pain found to have a L sided moderate pneumothorax. ID consulted for management of bronchoscopy cultures with yeast in setting of cavitary lesions.     Per patient & wife at bed side he was admitted to [place], [place] in 07/2019 while working in [place]. He has brought all the paperwork and at that time, he was admitted for pneumonia with sputum's growing pseudomonas. At that time, he had CT chest that showed left sided necrotizing pneumonia with cavitary lung lesion. He had 3 AFBs which were negative. He never had bronchoscopy at that hospital. HIV was negative. He was treated for pseudomonas PNA with IV cipro and sent home on levaquin. Patient denies any h/o TB, no family h/o TB and no known contacts with TB. He was born in [place] but currently lives in [place]. He hasn't been able to see a pulmonologist due to taking care of his wife. He says that in the past  week, he's been having night sweats, lack of appetite. He has had hemoptysis a few weeks ago and none recently. No fevers or purulent sputum recently either.  Per wife, he has always been petite and had a lower BMI and his mother is similar. Denies history of IV drug use.  He is a chronic smoker of about 1-2 ppd for 35 years. Denies any travel outside the country though he has been through multiple airports.

## 2019-09-18 NOTE — PLAN OF CARE
Problem: Adult Inpatient Plan of Care  Goal: Plan of Care Review  Outcome: Ongoing (interventions implemented as appropriate)  Fall precaution maintained this shift call bell in reach bed in low position no acute distress noted vs stable. Instructed pt to call for assistance. Chest to in place and clamp by MD on day shift. Pt denies any pain this shift call. Skin remained intake this shift

## 2019-09-19 VITALS
BODY MASS INDEX: 15.27 KG/M2 | DIASTOLIC BLOOD PRESSURE: 68 MMHG | HEART RATE: 73 BPM | SYSTOLIC BLOOD PRESSURE: 111 MMHG | WEIGHT: 106.69 LBS | HEIGHT: 70 IN | RESPIRATION RATE: 17 BRPM | TEMPERATURE: 99 F | OXYGEN SATURATION: 99 %

## 2019-09-19 LAB
ANION GAP SERPL CALC-SCNC: 6 MMOL/L (ref 8–16)
ANISOCYTOSIS BLD QL SMEAR: SLIGHT
BACTERIA BLD CULT: NORMAL
BACTERIA BLD CULT: NORMAL
BASOPHILS # BLD AUTO: 0.03 K/UL (ref 0–0.2)
BASOPHILS NFR BLD: 0.6 % (ref 0–1.9)
BUN SERPL-MCNC: 11 MG/DL (ref 6–20)
CALCIUM SERPL-MCNC: 9.9 MG/DL (ref 8.7–10.5)
CHLORIDE SERPL-SCNC: 101 MMOL/L (ref 95–110)
CO2 SERPL-SCNC: 30 MMOL/L (ref 23–29)
CREAT SERPL-MCNC: 0.6 MG/DL (ref 0.5–1.4)
CRYPTOC AG SER QL LA: NEGATIVE
DIFFERENTIAL METHOD: ABNORMAL
EOSINOPHIL # BLD AUTO: 0.2 K/UL (ref 0–0.5)
EOSINOPHIL NFR BLD: 4.5 % (ref 0–8)
ERYTHROCYTE [DISTWIDTH] IN BLOOD BY AUTOMATED COUNT: 14.6 % (ref 11.5–14.5)
EST. GFR  (AFRICAN AMERICAN): >60 ML/MIN/1.73 M^2
EST. GFR  (NON AFRICAN AMERICAN): >60 ML/MIN/1.73 M^2
GIANT PLATELETS BLD QL SMEAR: PRESENT
GLUCOSE SERPL-MCNC: 84 MG/DL (ref 70–110)
HCT VFR BLD AUTO: 42.6 % (ref 40–54)
HGB BLD-MCNC: 14 G/DL (ref 14–18)
HIV 1+2 AB+HIV1 P24 AG SERPL QL IA: NEGATIVE
IMM GRANULOCYTES # BLD AUTO: 0.02 K/UL (ref 0–0.04)
IMM GRANULOCYTES NFR BLD AUTO: 0.4 % (ref 0–0.5)
LYMPHOCYTES # BLD AUTO: 1.8 K/UL (ref 1–4.8)
LYMPHOCYTES NFR BLD: 34.5 % (ref 18–48)
MCH RBC QN AUTO: 32.9 PG (ref 27–31)
MCHC RBC AUTO-ENTMCNC: 32.9 G/DL (ref 32–36)
MCV RBC AUTO: 100 FL (ref 82–98)
MONOCYTES # BLD AUTO: 0.8 K/UL (ref 0.3–1)
MONOCYTES NFR BLD: 15.6 % (ref 4–15)
NEUTROPHILS # BLD AUTO: 2.4 K/UL (ref 1.8–7.7)
NEUTROPHILS NFR BLD: 44.4 % (ref 38–73)
NRBC BLD-RTO: 0 /100 WBC
PLATELET # BLD AUTO: 180 K/UL (ref 150–350)
PLATELET BLD QL SMEAR: ABNORMAL
PMV BLD AUTO: 11.8 FL (ref 9.2–12.9)
POTASSIUM SERPL-SCNC: 4.1 MMOL/L (ref 3.5–5.1)
RBC # BLD AUTO: 4.25 M/UL (ref 4.6–6.2)
SODIUM SERPL-SCNC: 137 MMOL/L (ref 136–145)
WBC # BLD AUTO: 5.33 K/UL (ref 3.9–12.7)

## 2019-09-19 PROCEDURE — 36415 COLL VENOUS BLD VENIPUNCTURE: CPT

## 2019-09-19 PROCEDURE — 97161 PT EVAL LOW COMPLEX 20 MIN: CPT

## 2019-09-19 PROCEDURE — 85025 COMPLETE CBC W/AUTO DIFF WBC: CPT

## 2019-09-19 PROCEDURE — 80048 BASIC METABOLIC PNL TOTAL CA: CPT

## 2019-09-19 PROCEDURE — 99239 HOSP IP/OBS DSCHRG MGMT >30: CPT | Mod: ,,, | Performed by: PHYSICIAN ASSISTANT

## 2019-09-19 PROCEDURE — 86703 HIV-1/HIV-2 1 RESULT ANTBDY: CPT

## 2019-09-19 PROCEDURE — 99239 PR HOSPITAL DISCHARGE DAY,>30 MIN: ICD-10-PCS | Mod: ,,, | Performed by: PHYSICIAN ASSISTANT

## 2019-09-19 PROCEDURE — 25000003 PHARM REV CODE 250: Performed by: PHYSICIAN ASSISTANT

## 2019-09-19 RX ORDER — FOLIC ACID 1 MG/1
1 TABLET ORAL DAILY
Qty: 90 TABLET | Refills: 3 | Status: SHIPPED | OUTPATIENT
Start: 2019-09-20 | End: 2020-09-19

## 2019-09-19 RX ORDER — THIAMINE HCL 500 MG
500 TABLET ORAL DAILY
COMMUNITY
Start: 2019-09-19

## 2019-09-19 RX ADMIN — FOLIC ACID 1 MG: 1 TABLET ORAL at 08:09

## 2019-09-19 RX ADMIN — THERA TABS 1 TABLET: TAB at 08:09

## 2019-09-19 RX ADMIN — GUAIFENESIN AND DEXTROMETHORPHAN HYDROBROMIDE 1 TABLET: 600; 30 TABLET, EXTENDED RELEASE ORAL at 08:09

## 2019-09-19 NOTE — ASSESSMENT & PLAN NOTE
- serum EtOH 289 on presentation  - reports drinking daily 6 pack of beer since he was a child  - has gone days without drinking and not experienced withdrawal  - start scheduled ativan taper, see order  - STEPHEN  - discharged on thiamine, folate

## 2019-09-19 NOTE — PT/OT/SLP EVAL
Physical Therapy Evaluation and Discharge Note    Patient Name:  Ki Chacon   MRN:  [id number]    Recommendations:     Discharge Recommendations:  home   Discharge Equipment Recommendations: none   Barriers to discharge: None    Assessment:     Ki Chacon is a 53 y.o. male admitted with a medical diagnosis of Primary spontaneous pneumothorax. .  At this time, patient is functioning at their prior level of function and does not require further acute PT services.     Recent Surgery: * No surgery found *      Plan:     During this hospitalization, patient does not require further acute PT services.  Please re-consult if situation changes.      Subjective     Chief Complaint: none noted   Patient/Family Comments/goals: Pt pleasant and willing to participate with therapy on this date.    Pain/Comfort:  · Pain Rating 1: 0/10    Patients cultural, spiritual, Faith conflicts given the current situation: no    Living Environment:  Pt lives with wife in first floor apartment with 0 FRAN.   Prior to admission, patients level of function was Ind.  Equipment used at home: none.  DME owned (not currently used): none.  Upon discharge, patient will have assistance from wife.    Objective:     Communicated with RN prior to session.  Patient found supine with   upon PT entry to room.    General Precautions: Standard, fall   Orthopedic Precautions:N/A   Braces: N/A     Exams:  · Gross Motor Coordination:  WFL  · RLE ROM: WFL  · RLE Strength: WFL  · LLE ROM: WFL  · LLE Strength: WFL    Functional Mobility:  · Bed Mobility:     · Supine to Sit: independence  · Sit to Supine: independence  · Transfers:     · Sit to Stand:  independence with no AD  · Gait: Community distances Ind.   · Balance: Ind.     AM-PAC 6 CLICK MOBILITY  Total Score:24       Therapeutic Activities and Exercises:   - Pt educated on:   -PT roles, expectations, and POC    -Safety with mobility   -Benefits of OOB activities to increase strength and functional  mobility    -Performing ther ex for increasing LE ROM and strength   -Discharge recommendations     AM-PAC 6 CLICK MOBILITY  Total Score:24     Patient left up in room with call button in reach.    GOALS:   Multidisciplinary Problems     Physical Therapy Goals     Not on file                History:     Past Medical History:   Diagnosis Date    Ankylosis     Emphysema of lung     Pneumonia     Pneumothorax        Past Surgical History:   Procedure Laterality Date    BLADDER DIVERTICULECTOMY      CHEST TUBE INSERTION Left        Time Tracking:     PT Received On: 09/19/19  PT Start Time: 0858     PT Stop Time: 0908  PT Total Time (min): 10 min     Billable Minutes: Evaluation 10      Jesus Graham, PT  09/19/2019

## 2019-09-19 NOTE — ASSESSMENT & PLAN NOTE
Cavitary lesion of lung    Patient presents with recurrent cough, shortness of breath and was found to have pneumothorax on CTA. CTA showed left apical cavitary lesion with peripheral nodular thickening measuring approximately 3.9 x 2.9 cm;  Additional left upper lobe spiculated pulmonary nodule measuring 1.3 cm; Right lower lobe ground-glass opacity; Right apical cavitary nodule measuring 1.5 cm;  Right upper lobe solid nodule measuring 0.9 cm. Upper lobe predominant centrilobular and paraseptal emphysema as well as bronchiectasis.  - concern for non-tuberculosis mycobacteria vs. Malignancy  - Pulmonology consulted, chest tube placed to suction  - bronchoscopy completed, biopsies, flow cytometry, cell count, and cultures taken  - respiratory culture no growth   - AFB stain, quantiferon gold negative  - AFB culture pending   - Pathology results pending  - Fungal culture with candida albicans- likely colonization of oral/ upper airway   - ID consulted, appreciate recs  - blastomyces, histoplasmosis, and cryptococcus sent  - HIV negative  - CXR with resolution of pneumothorax, chest tube removed 9/18 without difficulty  - repeat CXR without pneumothorax  - ambulatory referral to [facility] pulmonology given  - ambulatory referral to ID given, will follow up cultures and pathology

## 2019-09-19 NOTE — PLAN OF CARE
Problem: Adult Inpatient Plan of Care  Goal: Plan of Care Review  Outcome: Ongoing (interventions implemented as appropriate)  Pt is discharge to home in stable condition iv removed and discharge instruction has been given and pt states understanding.

## 2019-09-19 NOTE — PLAN OF CARE
Problem: Adult Inpatient Plan of Care  Goal: Plan of Care Review  Outcome: Ongoing (interventions implemented as appropriate)  Alert and oriented. Resp even and unlabored. Fall and injury free. Dressing intact to left flank area.  Denies complaint of pain or discomfort. Bed low and locked. Side rails up x 2. Call bell in reach.

## 2019-09-20 NOTE — PLAN OF CARE
Patient discharged home with no needs.  Patient's transportation home provided by his spouse via personal vehicle.       09/20/19 1018   Final Note   Assessment Type Final Discharge Note   Anticipated Discharge Disposition Home   What phone number can be called within the next 1-3 days to see how you are doing after discharge? [phone]   Hospital Follow Up  Appt(s) scheduled? Yes   Right Care Referral Info   Post Acute Recommendation No Care

## 2019-09-23 LAB
ASPERGILLUS AB SER QL ID: NORMAL
B DERMAT AB SER QL ID: NORMAL
BLASTOMYCES AG INTERP: NEGATIVE
BLASTOMYCES AG RESULT: NORMAL NG/ML
BLASTOMYCES AG SPECIMEN: NORMAL
C IMMITIS AB SER QL ID: NORMAL
H CAPSUL AB SER QL ID: NORMAL

## 2019-10-15 LAB — FUNGUS SPEC CULT: ABNORMAL

## 2019-10-24 LAB — FUNGUS SPEC CULT: NORMAL

## 2019-10-30 DIAGNOSIS — J98.4 CAVITARY LESION OF LUNG: Primary | ICD-10-CM

## 2019-11-06 DIAGNOSIS — J93.11 PRIMARY SPONTANEOUS PNEUMOTHORAX: Primary | ICD-10-CM

## 2019-11-16 LAB
ACID FAST MOD KINY STN SPEC: NORMAL
MYCOBACTERIUM SPEC QL CULT: NORMAL

## 2019-11-18 LAB
ACID FAST MOD KINY STN SPEC: NORMAL
MYCOBACTERIUM SPEC QL CULT: NORMAL

## 2020-01-20 ENCOUNTER — TELEPHONE (OUTPATIENT)
Dept: PULMONOLOGY | Facility: CLINIC | Age: 55
End: 2020-01-20

## 2020-01-20 ENCOUNTER — HOSPITAL ENCOUNTER (OUTPATIENT)
Dept: PULMONOLOGY | Facility: CLINIC | Age: 55
Discharge: HOME | End: 2020-01-20
Payer: MEDICAID

## 2020-01-20 ENCOUNTER — OFFICE VISIT (OUTPATIENT)
Dept: PULMONOLOGY | Facility: CLINIC | Age: 55
End: 2020-01-20
Payer: MEDICAID

## 2020-01-20 ENCOUNTER — HOSPITAL ENCOUNTER (OUTPATIENT)
Dept: RADIOLOGY | Facility: HOSPITAL | Age: 55
Discharge: HOME | End: 2020-01-20
Attending: INTERNAL MEDICINE
Payer: MEDICAID

## 2020-01-20 VITALS
SYSTOLIC BLOOD PRESSURE: 128 MMHG | HEART RATE: 103 BPM | HEIGHT: 70 IN | OXYGEN SATURATION: 99 % | BODY MASS INDEX: 15.43 KG/M2 | WEIGHT: 107.81 LBS | DIASTOLIC BLOOD PRESSURE: 83 MMHG

## 2020-01-20 DIAGNOSIS — R91.1 LUNG NODULE: ICD-10-CM

## 2020-01-20 DIAGNOSIS — J43.1 PANLOBULAR EMPHYSEMA: ICD-10-CM

## 2020-01-20 DIAGNOSIS — J43.1 PANLOBULAR EMPHYSEMA: Primary | ICD-10-CM

## 2020-01-20 DIAGNOSIS — J98.4 CAVITARY LESION OF LUNG: ICD-10-CM

## 2020-01-20 DIAGNOSIS — R91.1 LUNG NODULE: Primary | ICD-10-CM

## 2020-01-20 DIAGNOSIS — J18.9 CAVITARY PNEUMONIA: ICD-10-CM

## 2020-01-20 DIAGNOSIS — J98.4 CAVITARY PNEUMONIA: ICD-10-CM

## 2020-01-20 LAB
DLCO ADJ PRE: 14.25 ML/(MIN*MMHG) (ref 22.87–36.73)
DLCO SINGLE BREATH LLN: 22.87
DLCO SINGLE BREATH PRE REF: 47.8 %
DLCO SINGLE BREATH REF: 29.8
DLCOC SBVA LLN: 3.08
DLCOC SBVA PRE REF: 69.3 %
DLCOC SBVA REF: 4.32
DLCOC SINGLE BREATH LLN: 22.87
DLCOC SINGLE BREATH PRE REF: 47.8 %
DLCOC SINGLE BREATH REF: 29.8
DLCOCSBVAULN: 5.56
DLCOCSINGLEBREATHULN: 36.73
DLCOSINGLEBREATHULN: 36.73
DLCOVA LLN: 3.08
DLCOVA PRE REF: 69.3 %
DLCOVA PRE: 2.99 ML/(MIN*MMHG*L) (ref 3.08–5.56)
DLCOVA REF: 4.32
DLCOVAULN: 5.56
DLVAADJ PRE: 2.99 ML/(MIN*MMHG*L) (ref 3.08–5.56)
FEF 25 75 LLN: 1.79
FEF 25 75 PRE REF: 54.8 %
FEF 25 75 REF: 3.37
FEV05 LLN: 1.71
FEV05 REF: 2.85
FEV1 FVC LLN: 68
FEV1 FVC PRE REF: 92.9 %
FEV1 FVC REF: 79
FEV1 LLN: 2.89
FEV1 PRE REF: 66.9 %
FEV1 REF: 3.73
FVC LLN: 3.69
FVC PRE REF: 71.9 %
FVC REF: 4.74
IVC PRE: 3.19 L (ref 3.69–5.79)
IVC SINGLE BREATH LLN: 3.69
IVC SINGLE BREATH PRE REF: 67.3 %
IVC SINGLE BREATH REF: 4.74
IVCSINGLEBREATHULN: 5.79
PEF LLN: 7.24
PEF PRE REF: 50.8 %
PEF REF: 9.48
PHYSICIAN COMMENT: ABNORMAL
PRE DLCO: 14.25 ML/(MIN*MMHG) (ref 22.87–36.73)
PRE FEF 25 75: 1.85 L/S (ref 1.79–4.95)
PRE FET 100: 5.84 SEC
PRE FEV05 REF: 68.4 %
PRE FEV1 FVC: 73.33 % (ref 67.83–90.09)
PRE FEV1: 2.5 L (ref 2.89–4.57)
PRE FEV5: 1.95 L (ref 1.71–3.98)
PRE FVC: 3.4 L (ref 3.69–5.79)
PRE PEF: 4.82 L/S (ref 7.24–11.72)
VA PRE: 4.76 L (ref 6.75–6.75)
VA SINGLE BREATH LLN: 6.75
VA SINGLE BREATH PRE REF: 70.5 %
VA SINGLE BREATH REF: 6.75
VASINGLEBREATHULN: 6.75

## 2020-01-20 PROCEDURE — 94010 BREATHING CAPACITY TEST: CPT | Mod: PBBFAC | Performed by: INTERNAL MEDICINE

## 2020-01-20 PROCEDURE — 71250 CT CHEST WITHOUT CONTRAST: ICD-10-PCS | Mod: 26,,, | Performed by: RADIOLOGY

## 2020-01-20 PROCEDURE — 71250 CT THORAX DX C-: CPT | Mod: TC

## 2020-01-20 PROCEDURE — 94010 BREATHING CAPACITY TEST: ICD-10-PCS | Mod: 26,S$PBB,, | Performed by: INTERNAL MEDICINE

## 2020-01-20 PROCEDURE — 94729 DIFFUSING CAPACITY: CPT | Mod: 26,S$PBB,, | Performed by: INTERNAL MEDICINE

## 2020-01-20 PROCEDURE — 71250 CT THORAX DX C-: CPT | Mod: 26,,, | Performed by: RADIOLOGY

## 2020-01-20 PROCEDURE — 99999 PR PBB SHADOW E&M-EST. PATIENT-LVL III: ICD-10-PCS | Mod: PBBFAC,,, | Performed by: INTERNAL MEDICINE

## 2020-01-20 PROCEDURE — 99213 OFFICE O/P EST LOW 20 MIN: CPT | Mod: PBBFAC,25 | Performed by: INTERNAL MEDICINE

## 2020-01-20 PROCEDURE — 94729 PR C02/MEMBANE DIFFUSE CAPACITY: ICD-10-PCS | Mod: 26,S$PBB,, | Performed by: INTERNAL MEDICINE

## 2020-01-20 PROCEDURE — 94010 BREATHING CAPACITY TEST: CPT | Mod: 26,S$PBB,, | Performed by: INTERNAL MEDICINE

## 2020-01-20 PROCEDURE — 99214 OFFICE O/P EST MOD 30 MIN: CPT | Mod: 25,S$PBB,, | Performed by: INTERNAL MEDICINE

## 2020-01-20 PROCEDURE — 99999 PR PBB SHADOW E&M-EST. PATIENT-LVL III: CPT | Mod: PBBFAC,,, | Performed by: INTERNAL MEDICINE

## 2020-01-20 PROCEDURE — 94729 DIFFUSING CAPACITY: CPT | Mod: PBBFAC | Performed by: INTERNAL MEDICINE

## 2020-01-20 PROCEDURE — 99214 PR OFFICE/OUTPT VISIT, EST, LEVL IV, 30-39 MIN: ICD-10-PCS | Mod: 25,S$PBB,, | Performed by: INTERNAL MEDICINE

## 2020-01-20 RX ORDER — MEGESTROL ACETATE 20 MG/1
TABLET ORAL
COMMUNITY
Start: 2020-01-06 | End: 2020-02-19 | Stop reason: SDUPTHER

## 2020-01-20 RX ORDER — ALBUTEROL SULFATE 90 UG/1
2 AEROSOL, METERED RESPIRATORY (INHALATION) EVERY 6 HOURS PRN
Qty: 1 INHALER | Refills: 6 | Status: SHIPPED | OUTPATIENT
Start: 2020-01-20

## 2020-01-20 NOTE — PROGRESS NOTES
Subjective:       Patient ID: Ki Chacon is a 54 y.o. male.    Hospital follow up:    Chief Complaint: Pneumonia    53 y/o M with PMH of emphysema, tobacco dependence, anxiety and prior h/o spontaneous pneumothorax who presented to the ED ini September c/o left sided chest pain, with imaging showing a large left sided pneumothorax and b/l cavitary lung lesions. While talking to the pt, he was actually admitted to a hospital 2 months ago while working in [place]. He brought all the paperwork and at that time, he was admitted for pneumonia with sputum's growing pseudomonas. At that time, he had CT chest that showed left cavitary lung lesion. He had 3 AFBs which were negative. He never had bronchoscopy at that hospital. HIV was negative. He was treated for pneumonia and discharged home. While talking to the pt, he denies any h/o TB, no family h/o TB and no known contacts with TB. He was born in [place] but currently lives in [place]. He denies ever seeing a pulmonologist. He does admit that over the past week, he's been having night sweats, lack of appetite. He did have weight loss while hospitalized 2 months ago, but has since gained some weight (however still below his baseline). He does admit to some hemoptysis a few weeks ago and intermittently but none recently. He is a chronic smoker of about 1-2 ppd for 35 years. Denies any travel outside the country.    Patient hospital course was as follows:    Hospital Course:   Patient admitted to medicine for spontaneous pneumothorax. Pulmonology consulted, chest tube placed. Started on ativan taper for alcohol withdrawal, as well as thiamine, folate. Bronchoscopy completed with lavage of MAINOR cavitary lesion. Concern for malignancy or nontuberculous mycobacteria. Bacterial respiratory culture with NGTD. Fungal culture positive for candida albicans yeast, likely representing oral/upperairway colonization. Quantiferon gold negative. AFB stain negative, AFB  "culture pending. Pathology pending. ID consulted, serologies for blastomycosis, histoplasmosis, and cryptococcus sent. HIV negative. Chest tube removed, repeat CXR without reoccurrence of pneumothorax. Patient is at high risk for spontaneous pneumothorax and hemoptysis in future. Patient has no insurance ([place] medicare pending), referral for  [facility] given. Patient to follow up infectious disease, referral given. ID to follow up cultures. Patient discharged on thiamine and folate. Patient verbalized understanding. All questions answered.      All cultures and cytology were negative for cancer. Patient did follow up with pulmonary at [facility] in December but no images were obtained (although ordered by pulmonary there)  Patient now has insurance and is [place] [facility] [place] an abnormal CT of chest.      Since hospitalization has stopped smoking.  Denies cough currently.  Weight is "up and down".  Has weighed 130# at most.      Mild dyspnea on exertion.  Dyspnea when talking at times.  Cannot climb a flight of stairs.      Review of Systems   Constitutional: Negative for fever and night sweats.   HENT: Negative for trouble swallowing.    Respiratory: Negative for cough.    Gastrointestinal: Negative for acid reflux.       Objective:       Vitals:    01/20/20 0948   BP: 128/83   BP Location: Left arm   Patient Position: Sitting   Pulse: 103   SpO2: 99%   Weight: 48.9 kg (107 lb 12.9 oz)   Height: 5' 10" (1.778 m)     Physical Exam   Constitutional: He is oriented to person, place, and time. He appears well-developed.   Cardiovascular: Normal rate and regular rhythm.   Pulmonary/Chest: Normal expansion and symmetric chest wall expansion. He has no wheezes. He has no rales.   Musculoskeletal: Normal range of motion. He exhibits no edema.   Lymphadenopathy: No supraclavicular adenopathy is present.     He has no cervical adenopathy.   Neurological: He is alert and oriented to person, place, and time. "   Skin: Skin is warm and dry.   Psychiatric: He has a normal mood and affect.   Vitals reviewed.       Personal Diagnostic Review  Please see hospital summary above.    CT of chest with small RUL and large MAINOR cavitary lesion form 9/14/2019.    CT of chest performed today with resolution of RUL cavitary lesion.  Some apical GGO.  Thin walled cyst/bullae.  Although many findings are still present, overall imaging has improved.    PFTs post clinic. Without obstruction, moderate decrease in diffusion.  No flowsheet data found.      Assessment:       1. Panlobular emphysema    2. Lung nodule    3. Cavitary pneumonia    4. Cavitary lesion of lung        Outpatient Encounter Medications as of 1/20/2020   Medication Sig Dispense Refill    folic acid (FOLVITE) 1 MG tablet Take 1 tablet (1 mg total) by mouth once daily. 90 tablet 3    megestrol (MEGACE) 20 MG Tab       albuterol (PROVENTIL/VENTOLIN HFA) 90 mcg/actuation inhaler Inhale 2 puffs into the lungs every 6 (six) hours as needed. 1 Inhaler 6    thiamine HCl (VITAMIN B-1) 500 MG tablet Take 1 tablet (500 mg total) by mouth once daily. (Patient not taking: Reported on 1/20/2020)      umeclidinium-vilanterol (ANORO ELLIPTA) 62.5-25 mcg/actuation DsDv Inhale 1 puff into the lungs once daily. Controller 1 each 5     No facility-administered encounter medications on file as of 1/20/2020.      Orders Placed This Encounter   Procedures    CT Chest Without Contrast     Standing Status:   Future     Number of Occurrences:   1     Standing Expiration Date:   1/20/2021     Order Specific Question:   May the Radiologist modify the order per protocol to meet the clinical needs of the patient?     Answer:   Yes    Spirometry without Bronchodilator     Standing Status:   Future     Number of Occurrences:   1     Standing Expiration Date:   1/19/2021    DLCO-Carbon Monoxide Diffusing Capacity     Standing Status:   Future     Number of Occurrences:   1     Standing Expiration  Date:   1/19/2021     Plan:     Problem List Items Addressed This Visit     Cavitary lesion of lung    Overview     Diagnosed and treated as infectious in 9/2019.  All cultures and cytology negative at that time, but had been treated in [place] previously.    Repeat CT of chest today shows overall improvement of nodules and cavitary lesions.  Will follow up official radiology reading.         Panlobular emphysema - Primary    Overview     Baseline PFTs today.   Will start anoro daily for control  Albuterol for rescue and prevention.         Relevant Medications    umeclidinium-vilanterol (ANORO ELLIPTA) 62.5-25 mcg/actuation DsDv    albuterol (PROVENTIL/VENTOLIN HFA) 90 mcg/actuation inhaler    Other Relevant Orders    Spirometry without Bronchodilator (Completed)    DLCO-Carbon Monoxide Diffusing Capacity      Other Visit Diagnoses     Lung nodule        Relevant Orders    CT Chest Without Contrast    Cavitary pneumonia        Relevant Orders    CT Chest Without Contrast        Spoke to patient's wife regarding stability/improved Chest CT.  Would like to continue to follow with CT of chest in 6 months.  Verbalized and understood plan

## 2020-01-20 NOTE — LETTER
January 20, 2020      Sana Watkins PA-C  [address]  [facility] [address]           [facility]  [address]  [facility] [address]  Phone: [phone]          Patient: Ki Chacon   MR Number: [id number]   YOB: 1965   Date of Visit: 1/20/2020       Dear Sana Watkins:    Thank you for referring Ki Chacon to me for evaluation. Attached you will find relevant portions of my assessment and plan of care.    If you have questions, please do not hesitate to call me. I look forward to following Ki Chacon along with you.    Sincerely,    Dalia Gonzalez MD    Enclosure  CC:  No Recipients    If you would like to receive this communication electronically, please contact [email or web address] or [phone] to request more information on [email or web address] Link access.    For providers and/or their staff who would like to refer a patient to [facility], please contact us through our one-stop-shop provider referral line, [facility], at [phone].    If you feel you have received this communication in error or would no longer like to receive these types of communications, please e-mail [email or web address]

## 2020-01-20 NOTE — TELEPHONE ENCOUNTER
Spoke to patient's wife regarding stability/improved Chest CT.  Would like to continue to follow with CT of chest in 6 months.

## 2020-01-21 ENCOUNTER — TELEPHONE (OUTPATIENT)
Dept: PULMONOLOGY | Facility: CLINIC | Age: 55
End: 2020-01-21

## 2020-01-21 NOTE — TELEPHONE ENCOUNTER
I spoke to Mrs Izabel Chacon to let her know we haven't received a fax for prior authorization. However, I will contact the pharmacy. Mrs Chacon verbalized understanding.

## 2020-01-29 ENCOUNTER — TELEPHONE (OUTPATIENT)
Dept: PULMONOLOGY | Facility: CLINIC | Age: 55
End: 2020-01-29

## 2020-01-29 NOTE — TELEPHONE ENCOUNTER
I spoke to Mrs Chacon to let her know Anoro was not approved through [place]. Preferred alternative [facility] and rx will not need PA per representative with insurance. Dr Gonzalez sent in to preferred pharmacy. Mrs Chacon verbalized understanding.

## 2020-01-29 NOTE — TELEPHONE ENCOUNTER
----- Message from Jenifer Del Toro sent at 1/29/2020  3:11 PM CST -----  Contact: [phone]  Pt states he would like to speak with nurse in regards to his Pa please call back to discuss

## 2020-02-13 ENCOUNTER — OFFICE VISIT (OUTPATIENT)
Dept: FAMILY MEDICINE | Facility: CLINIC | Age: 55
End: 2020-02-13
Payer: MEDICAID

## 2020-02-13 VITALS
DIASTOLIC BLOOD PRESSURE: 83 MMHG | RESPIRATION RATE: 16 BRPM | HEIGHT: 70 IN | WEIGHT: 114 LBS | BODY MASS INDEX: 16.32 KG/M2 | SYSTOLIC BLOOD PRESSURE: 120 MMHG | HEART RATE: 93 BPM

## 2020-02-13 DIAGNOSIS — J43.1 PANLOBULAR EMPHYSEMA: ICD-10-CM

## 2020-02-13 DIAGNOSIS — M19.90 ARTHRITIS: Primary | ICD-10-CM

## 2020-02-13 DIAGNOSIS — Z76.89 ENCOUNTER TO ESTABLISH CARE WITH NEW DOCTOR: ICD-10-CM

## 2020-02-13 PROCEDURE — 99203 OFFICE O/P NEW LOW 30 MIN: CPT | Mod: S$GLB,,, | Performed by: FAMILY MEDICINE

## 2020-02-13 PROCEDURE — 99203 PR OFFICE/OUTPT VISIT, NEW, LEVL III, 30-44 MIN: ICD-10-PCS | Mod: S$GLB,,, | Performed by: FAMILY MEDICINE

## 2020-02-13 RX ORDER — IBUPROFEN 800 MG/1
800 TABLET ORAL 3 TIMES DAILY
Qty: 30 TABLET | Refills: 3 | Status: SHIPPED | OUTPATIENT
Start: 2020-02-13

## 2020-02-19 RX ORDER — MEGESTROL ACETATE 20 MG/1
20 TABLET ORAL DAILY
Qty: 30 TABLET | Refills: 3 | Status: SHIPPED | OUTPATIENT
Start: 2020-02-19

## 2020-02-19 NOTE — TELEPHONE ENCOUNTER
02/19/2020 4:11pm patient asking for refill Megace to [facility]. Please advise thanks        ----- Message from Beto Ortiz sent at 2/19/2020 12:30 PM CST -----  Contact: Wife/Izabel Paiz called in and stated pharmacy never received patients Rx for megestrol (MEGACE) 20 MG Tab.    [facility] - [place], [place] - [address]  [address]  [place] [address]  Phone: [phone] Fax: [phone]    Izabel's call back number is [phone]

## 2020-02-20 DIAGNOSIS — Z12.11 COLON CANCER SCREENING: ICD-10-CM

## 2020-02-24 NOTE — PROGRESS NOTES
"[facility] - Clinic Note    Subjective      Mr. Chacon is a 54 y.o. male who presents to clinic to establish care.   Accompanied by his wife.     Has a PMH of emphysema along, cavitary lesions of the lung, status post spontaneous pneumothorax, and ankylosing spondylitis.    He is followed by Dr. Gonzalez, Pulmonology. Last visit was 1/20/20.     Started on anoro daily and albuterol for rescue and plan to repeat CT chest.    Has a history of alcohol abuse.  Still drinks alcohol. Takes folate and thiamine supplements.     Take megace for weight gain. 20mg daily.     Ibuprofen prn for arthritis.       PM Ki has a past medical history of Ankylosis, Emphysema of lung, Pneumonia, and Pneumothorax.   PSX Ki has a past surgical history that includes Bladder diverticulectomy and Chest tube insertion (Left).    Ki's Family history is unknown by patient.    Ki reports that he quit smoking about 5 months ago. His smoking use included cigarettes. He smoked 1.00 pack per day. He has never used smokeless tobacco. He reports that he drinks alcohol. He reports that he has current or past drug history.   [facility] Ki has No Known Allergies.   MED Ki has a current medication list which includes the following prescription(s): albuterol, folic acid, glycopyrrolate-formoterol, vitamin b-1, ibuprofen, and megestrol.     Review of Systems   Constitutional: Negative for chills, fatigue and fever.   HENT: Negative for congestion.    Eyes: Negative for visual disturbance.   Respiratory: Negative for cough, shortness of breath and wheezing.    Cardiovascular: Negative for chest pain and leg swelling.   Gastrointestinal: Negative for abdominal pain.   Neurological: Negative for dizziness, numbness and headaches.     Objective     /83   Pulse 93   Resp 16   Ht 5' 10" (1.778 m)   Wt 51.7 kg (114 lb)   BMI 16.36 kg/m²     Physical Exam   Constitutional: He appears well-developed and well-nourished. No distress. "   Eyes: Right eye exhibits no discharge. Left eye exhibits no discharge.   Neck: No thyromegaly present.   Cardiovascular: Normal rate, regular rhythm, normal heart sounds and intact distal pulses. Exam reveals no gallop and no friction rub.   No murmur heard.  Pulmonary/Chest: Effort normal and breath sounds normal. No respiratory distress. He has no wheezes. He has no rales.   Lymphadenopathy:     He has no cervical adenopathy.   Neurological: He is alert.   Skin: Skin is warm. Capillary refill takes less than 2 seconds. He is not diaphoretic.   Psychiatric: He has a normal mood and affect. His behavior is normal.   Vitals reviewed.     Assessment/Plan     Ki was seen today for establish care.    Diagnoses and all orders for this visit:    Arthritis  -     ibuprofen (ADVIL,MOTRIN) 800 MG tablet; Take 1 tablet (800 mg total) by mouth 3 (three) times daily.    Encounter to establish care with new doctor    Panlobular emphysema    -on review patient's chart, he has an extensive respiratory history that will require continued medical care and treatment by his Pulmonologist and primary physician.    Follow up in about 3 months (around 5/13/2020).     Carlene Deutsch MD  Family Medicine  [facility]

## 2021-02-25 DIAGNOSIS — Z12.11 COLON CANCER SCREENING: ICD-10-CM

## 2021-03-04 DIAGNOSIS — Z11.59 NEED FOR HEPATITIS C SCREENING TEST: ICD-10-CM

## 2022-10-20 NOTE — DISCHARGE SUMMARY
"[facility] Hospital Medicine  Discharge Summary      Patient Name: Ki Chacon  MRN: [id number]  Admission Date: 9/14/2019  Hospital Length of Stay: 5 days  Discharge Date and Time: 9/19/2019 12:26 PM  Attending Physician: Aimee att. providers found   Discharging Provider: Sana Watkins PA-C  Primary Care Provider: Primary Doctor Aimee  [facility] Medicine Team: [facility] MED F Sana Watkins PA-C    HPI:   Patient is a 54yo male with a PMHx of emphysema being admitted to medicine for spontaneous pneumothorax. Patient reports onset of chest pain and shortness of breath over the last several days. He first noticed his cough over the last year and it has progressively worsened with dyspnea on exertion. He has found it more difficult to ambulate or do any of his ADLs. He reports associated cough with sputum, fevers, nightsweats, and 20 pound weight loss in the last several months. He reports traveling as an  for work, but was recently in [place] in July for work and was admitted to the hospital for workup of hemoptysis and shortness of breath. At that time, he was found to have a cavitary lesion in his left lung. He reports an extensive workup with sputum samples that was negative for TB, but was diagnosed with a "bacteria that is not found in the [place]". He advised to contact his doctor at previous hospital for further information. He returned from [place] and has been unable to help care for his wife who was hospitalized for a hip replacement due to his illness. He reports smoking 1 pack per day for 35 years. He denies travel outside of the [place].    Of note, patient has a history of recurrent pneumothorax since he broke two ribs in 1995. He had another traumatic event in 2005 that resulted in another pneumothorax. Since that time, he has had several "crackles" in his lung that he has managed on his own.     In the ED, vitals stable on 4L O2. Intake labs remarkable for alcohol 289. CTA " chest showed left apical cavitary lesion with peripheral nodular thickening measuring approximately 3.9 x 2.9 cm. Additional left upper lobe spiculated pulmonary nodule measuring 1.3 cm, right lower lobe ground-glass opacity; right apical cavitary nodule measuring 1.5 cm; Right upper lobe solid nodule measuring 0.9 cm; Upper lobe predominant centrilobular and paraseptal emphysema as well as bronchiectasis. Pulmonology consulted and placed a chest tube for pneumothorax treatment, cultures obtained.    * No surgery found *      Hospital Course:   Patient admitted to medicine for spontaneous pneumothorax. Pulmonology consulted, chest tube placed. Started on ativan taper for alcohol withdrawal, as well as thiamine, folate. Bronchoscopy completed with lavage of MAINOR cavitary lesion. Concern for malignancy or nontuberculous mycobacteria. Bacterial respiratory culture with NGTD. Fungal culture positive for candida albicans yeast, likely representing oral/upperairway colonization. Quantiferon gold negative. AFB stain negative, AFB culture pending. Pathology pending. ID consulted, serologies for blastomycosis, histoplasmosis, and cryptococcus sent. HIV negative. Chest tube removed, repeat CXR without reoccurrence of pneumothorax. Patient is at high risk for spontaneous pneumothorax and hemoptysis in future. Patient has no insurance ([place] medicare pending), referral for  [facility] given. Patient to follow up infectious disease, referral given. ID to follow up cultures. Patient discharged on thiamine and folate. Patient verbalized understanding. All questions answered.      Consults:   Consults (From admission, onward)        Status Ordering Provider     Inpatient consult to Infectious Diseases  Once     Provider:  (Not yet assigned)    Completed TRANG EWING     Inpatient consult to Pulmonology  Once     Provider:  (Not yet assigned)    Completed SHEREE DEL ANGEL JR          * Primary spontaneous  pneumothorax  Cavitary lesion of lung    Patient presents with recurrent cough, shortness of breath and was found to have pneumothorax on CTA. CTA showed left apical cavitary lesion with peripheral nodular thickening measuring approximately 3.9 x 2.9 cm;  Additional left upper lobe spiculated pulmonary nodule measuring 1.3 cm; Right lower lobe ground-glass opacity; Right apical cavitary nodule measuring 1.5 cm;  Right upper lobe solid nodule measuring 0.9 cm. Upper lobe predominant centrilobular and paraseptal emphysema as well as bronchiectasis.  - concern for non-tuberculosis mycobacteria vs. Malignancy  - Pulmonology consulted, chest tube placed to suction  - bronchoscopy completed, biopsies, flow cytometry, cell count, and cultures taken  - respiratory culture no growth   - AFB stain, quantiferon gold negative  - AFB culture pending   - Pathology results pending  - Fungal culture with candida albicans- likely colonization of oral/ upper airway   - ID consulted, appreciate recs  - blastomyces, histoplasmosis, and cryptococcus sent  - HIV negative  - CXR with resolution of pneumothorax, chest tube removed 9/18 without difficulty  - repeat CXR without pneumothorax  - ambulatory referral to [facility] pulmonology given  - ambulatory referral to ID given, will follow up cultures and pathology     Alcohol dependence  - serum EtOH 289 on presentation  - reports drinking daily 6 pack of beer since he was a child  - has gone days without drinking and not experienced withdrawal  - start scheduled ativan taper, see order  - CIWA  - discharged on thiamine, folate      Final Active Diagnoses:    Diagnosis Date Noted POA    PRINCIPAL PROBLEM:  Primary spontaneous pneumothorax [J93.11] 09/14/2019 Yes    Alcohol dependence [F10.20] 09/15/2019 Yes    Cavitary lesion of lung [J98.4] 09/14/2019 Yes      Problems Resolved During this Admission:       Discharged Condition: good    Disposition: Home or Self Care    Follow Up:  Follow-up  Information     [facility] .    Specialty:  Pulmonary Disease  Contact information:  [address] [id number] [phone]                 Patient Instructions:      Ambulatory Referral to Pulmonology   Referral Priority: Routine Referral Type: Consultation   Referral Reason: Specialty Services Required   Referred to Provider: [facility] PULMONOLOG[facility] Requested Specialty: Pulmonary Disease   Number of Visits Requested: 1     Ambulatory Referral to Infectious Disease   Referral Priority: Routine Referral Type: Consultation   Referral Reason: Specialty Services Required   Requested Specialty: Infectious Diseases   Number of Visits Requested: 1     Diet Adult Regular     Notify your health care provider if you experience any of the following:  temperature >100.4     Notify your health care provider if you experience any of the following:  difficulty breathing or increased cough     Notify your health care provider if you experience any of the following:  increased confusion or weakness     Notify your health care provider if you experience any of the following:  persistent dizziness, light-headedness, or visual disturbances     Notify your health care provider if you experience any of the following:  severe uncontrolled pain     Activity as tolerated       Significant Diagnostic Studies: Labs:   CMP   Recent Labs   Lab 09/18/19  0546 09/19/19  0544    137   K 4.5 4.1    101   CO2 26 30*   GLU 94 84   BUN 8 11   CREATININE 0.7 0.6   CALCIUM 10.0 9.9   ANIONGAP 12 6*   ESTGFRAFRICA >60.0 >60.0   EGFRNONAA >60.0 >60.0   , CBC   Recent Labs   Lab 09/18/19  0546 09/19/19  0544   WBC 5.20 5.33   HGB 14.3 14.0   HCT 42.8 42.6    180   , Troponin   Recent Labs   Lab 09/14/19  0830   TROPONINI <0.006     Sputum Culture   Lab Results   Component Value Date    GSRESP <10 epithelial cells per low power field. 09/16/2019    GSRESP Rare WBC's 09/16/2019     GSRESP No organisms seen 09/16/2019    RESPIRATORYC No growth 09/16/2019     Contains abnormal data CTA Chest Non-Coronary (PE Study)   Order: [id number]   Status:  Final result   Visible to patient:  No (Not Released)   Next appt:  None   Details     Reading Physician Reading Date Result Priority   Anne Marie Wilkinson MD 9/14/2019 STAT      Narrative     EXAMINATION:  CTA CHEST NON CORONARY    CLINICAL HISTORY:  Chest pain, acute, PE suspected, high pretest prob;    TECHNIQUE:  Low dose axial images, sagittal and coronal reformations were obtained from the thoracic inlet to the lung bases following the IV administration of 75 mL of Omnipaque 350.  Contrast timing was optimized to evaluate the pulmonary arteries.  MIP images were performed.    COMPARISON:  Chest radiograph 09/14/2019    FINDINGS:  Base of Neck: No significant abnormality.    Thoracic soft tissues: Unremarkable.    Aorta: Left-sided aortic arch.  No aneurysm and no significant atherosclerosis    Heart: Normal size. No effusion.    Pulmonary vasculature: Main pulmonary artery is normal in caliber.  No pulmonary embolus identified to the level of the segmental pulmonary arteries.    Hetal/Mediastinum: No pathologic karl enlargement.    Airways: Patent.    Lungs/Pleura: Moderate-sized left pneumothorax.  No mediastinal shift.  Dependent left lung atelectasis.  Left apical cavitary lesion with peripheral nodular thickening measuring approximately 3.9 x 2.9 cm (image 22, series 3).  Additional left upper lobe spiculated pulmonary nodule measuring 1.3 cm (image 33, series 3).  Right lower lobe ground-glass opacity.  Right apical cavitary nodule measuring 1.5 cm (image 20, series 3).  Right upper lobe solid nodule measuring 0.9 cm (image 30, series 3).  Upper lobe predominant centrilobular and paraseptal emphysema as well as bronchiectasis.  No pleural effusion.    Esophagus: Unremarkable.    Upper Abdomen: No abnormality of the partially imaged upper  abdomen.    Bones: Anterior compression deformity of the L1 vertebral body, approximately 25%.  No suspicious lytic or sclerotic lesions.      Impression       Moderate size left pneumothorax.    Left apical 3.9 cm cavitary mass with peripheral nodular thickening.  Additional 1.5 cm right upper lobe cavitary nodule and bilateral solid pulmonary nodules.  Constellation of findings concerning for infectious process including atypical infection such is mycobacteria.  Neoplasm may also have this appearance; recommend correlation with prior imaging for further evaluation.    No pulmonary embolism identified to the level of the segmental pulmonary arteries.    Age indeterminate compression deformity of the L1 vertebral body of approximately 25%.    This report was flagged in Epic as abnormal.      Electronically signed by: Anne Marie Wilkinson  Date: 09/14/2019  Time: 10:34            Last Resulted: 09/14/19 10:34                X-Ray Chest 1 View   Order: [id number]   Status:  Final result   Visible to patient:  No (Not Released) Next appt:  None   Details     Reading Physician Reading Date Result Priority   Clau Clark MD 9/18/2019 Routine      Narrative     EXAMINATION:  XR CHEST 1 VIEW    CLINICAL HISTORY:  Remove of left pigtail catheter;    TECHNIQUE:  Single frontal view of the chest was performed.    COMPARISON:  Chest radiographs: 09/17/2019, 13:57 hours.  09/15/2019.  09/14/2019.    Chest CT: 09/14/2019.    FINDINGS:  I detect no pneumothorax following discontinuation of the left pleural pigtail catheter.    The patient has known multifocal patchy pulmonary opacities including cavitary lesions with upper zone predominance.  The largest is in the left upper lobe as observed previously.  Detail is better appreciated on CT of 09/14/2019.    No new disease identified.      Impression       Please see above.      Electronically signed by: Clau Clark MD  Date: 09/18/2019  Time: 09:18             Last Resulted:  09/18/19 09:18             Pending Diagnostic Studies:     Procedure Component Value Units Date/Time    Coccidioides Antibody, Serum [[id number]] Collected:  09/16/19 1028    Order Status:  Sent Lab Status:  In process Updated:  09/16/19 1028    Specimen:  Blood     Fungal Immunodiffusion - Blood [[id number]] Collected:  09/18/19 1837    Order Status:  Sent Lab Status:  In process Updated:  09/18/19 1842    Specimen:  Blood     Histoplasma antigen, serum [[id number]] Collected:  09/16/19 1028    Order Status:  Sent Lab Status:  In process Updated:  09/16/19 1028    Specimen:  Blood          Medications:  Reconciled Home Medications:      Medication List      START taking these medications    dextromethorphan-guaifenesin  mg  mg per 12 hr tablet  Commonly known as:  MUCINEX DM  Take 1 tablet by mouth 2 (two) times daily. for 10 days     folic acid 1 MG tablet  Commonly known as:  FOLVITE  Take 1 tablet (1 mg total) by mouth once daily.  Start taking on:  9/20/2019     vitamin B-1 500 MG tablet  Take 1 tablet (500 mg total) by mouth once daily.            Indwelling Lines/Drains at time of discharge:   Lines/Drains/Airways          None          Time spent on the discharge of patient: 36 minutes  Patient was seen and examined on the date of discharge and determined to be suitable for discharge.         Sana Watkins PA-C  Department of Hospital Medicine  [facility]   Previously Declined (within the last year)

## 2024-01-01 NOTE — TELEPHONE ENCOUNTER
----- Message from Candido Dangelo sent at 1/21/2020 10:12 AM CST -----  Contact: Izabel ( spouse ) @ [phone]  Caller calling to get an update on the prior authorization for ( ? - inhaler) caller is not sure of the name.    [id number] - [place], [place] - [address] AT [facility] [address]  [address]  [place] [address]  Phone: [phone] Fax: [phone]       52